# Patient Record
Sex: FEMALE | Race: WHITE | NOT HISPANIC OR LATINO | URBAN - METROPOLITAN AREA
[De-identification: names, ages, dates, MRNs, and addresses within clinical notes are randomized per-mention and may not be internally consistent; named-entity substitution may affect disease eponyms.]

---

## 2017-05-25 ENCOUNTER — OUTPATIENT (OUTPATIENT)
Dept: OUTPATIENT SERVICES | Facility: HOSPITAL | Age: 72
LOS: 1 days | Discharge: HOME | End: 2017-05-25

## 2017-06-28 DIAGNOSIS — Z12.31 ENCOUNTER FOR SCREENING MAMMOGRAM FOR MALIGNANT NEOPLASM OF BREAST: ICD-10-CM

## 2018-05-24 ENCOUNTER — INPATIENT (INPATIENT)
Facility: HOSPITAL | Age: 73
LOS: 3 days | Discharge: HOME | End: 2018-05-28
Attending: INTERNAL MEDICINE | Admitting: INTERNAL MEDICINE

## 2018-05-24 VITALS
DIASTOLIC BLOOD PRESSURE: 109 MMHG | OXYGEN SATURATION: 98 % | RESPIRATION RATE: 20 BRPM | TEMPERATURE: 98 F | SYSTOLIC BLOOD PRESSURE: 146 MMHG | HEART RATE: 64 BPM

## 2018-05-24 LAB
ANION GAP SERPL CALC-SCNC: 13 MMOL/L — SIGNIFICANT CHANGE UP (ref 7–14)
APPEARANCE UR: CLEAR — SIGNIFICANT CHANGE UP
BASOPHILS # BLD AUTO: 0.02 K/UL — SIGNIFICANT CHANGE UP (ref 0–0.2)
BASOPHILS NFR BLD AUTO: 0.4 % — SIGNIFICANT CHANGE UP (ref 0–1)
BILIRUB UR-MCNC: NEGATIVE — SIGNIFICANT CHANGE UP
BUN SERPL-MCNC: 13 MG/DL — SIGNIFICANT CHANGE UP (ref 10–20)
CALCIUM SERPL-MCNC: 9.3 MG/DL — SIGNIFICANT CHANGE UP (ref 8.5–10.1)
CHLORIDE SERPL-SCNC: 100 MMOL/L — SIGNIFICANT CHANGE UP (ref 98–110)
CK MB CFR SERPL CALC: 1.5 NG/ML — SIGNIFICANT CHANGE UP (ref 0.6–6.3)
CK SERPL-CCNC: 115 U/L — SIGNIFICANT CHANGE UP (ref 0–225)
CO2 SERPL-SCNC: 28 MMOL/L — SIGNIFICANT CHANGE UP (ref 17–32)
COLOR SPEC: YELLOW — SIGNIFICANT CHANGE UP
CREAT SERPL-MCNC: 0.7 MG/DL — SIGNIFICANT CHANGE UP (ref 0.7–1.5)
DIFF PNL FLD: NEGATIVE — SIGNIFICANT CHANGE UP
EOSINOPHIL # BLD AUTO: 0.06 K/UL — SIGNIFICANT CHANGE UP (ref 0–0.7)
EOSINOPHIL NFR BLD AUTO: 1.2 % — SIGNIFICANT CHANGE UP (ref 0–8)
GLUCOSE SERPL-MCNC: 95 MG/DL — SIGNIFICANT CHANGE UP (ref 70–99)
GLUCOSE UR QL: NEGATIVE MG/DL — SIGNIFICANT CHANGE UP
HCT VFR BLD CALC: 40.6 % — SIGNIFICANT CHANGE UP (ref 37–47)
HGB BLD-MCNC: 14.1 G/DL — SIGNIFICANT CHANGE UP (ref 12–16)
IMM GRANULOCYTES NFR BLD AUTO: 0.2 % — SIGNIFICANT CHANGE UP (ref 0.1–0.3)
KETONES UR-MCNC: NEGATIVE — SIGNIFICANT CHANGE UP
LEUKOCYTE ESTERASE UR-ACNC: (no result)
LYMPHOCYTES # BLD AUTO: 1.51 K/UL — SIGNIFICANT CHANGE UP (ref 1.2–3.4)
LYMPHOCYTES # BLD AUTO: 30.6 % — SIGNIFICANT CHANGE UP (ref 20.5–51.1)
MCHC RBC-ENTMCNC: 33.1 PG — HIGH (ref 27–31)
MCHC RBC-ENTMCNC: 34.7 G/DL — SIGNIFICANT CHANGE UP (ref 32–37)
MCV RBC AUTO: 95.3 FL — SIGNIFICANT CHANGE UP (ref 81–99)
MONOCYTES # BLD AUTO: 0.37 K/UL — SIGNIFICANT CHANGE UP (ref 0.1–0.6)
MONOCYTES NFR BLD AUTO: 7.5 % — SIGNIFICANT CHANGE UP (ref 1.7–9.3)
NEUTROPHILS # BLD AUTO: 2.96 K/UL — SIGNIFICANT CHANGE UP (ref 1.4–6.5)
NEUTROPHILS NFR BLD AUTO: 60.1 % — SIGNIFICANT CHANGE UP (ref 42.2–75.2)
NITRITE UR-MCNC: NEGATIVE — SIGNIFICANT CHANGE UP
PH UR: 7 — SIGNIFICANT CHANGE UP (ref 5–8)
PLATELET # BLD AUTO: 147 K/UL — SIGNIFICANT CHANGE UP (ref 130–400)
POTASSIUM SERPL-MCNC: 3.9 MMOL/L — SIGNIFICANT CHANGE UP (ref 3.5–5)
POTASSIUM SERPL-SCNC: 3.9 MMOL/L — SIGNIFICANT CHANGE UP (ref 3.5–5)
PROT UR-MCNC: NEGATIVE MG/DL — SIGNIFICANT CHANGE UP
RBC # BLD: 4.26 M/UL — SIGNIFICANT CHANGE UP (ref 4.2–5.4)
RBC # FLD: 13.1 % — SIGNIFICANT CHANGE UP (ref 11.5–14.5)
SODIUM SERPL-SCNC: 141 MMOL/L — SIGNIFICANT CHANGE UP (ref 135–146)
SP GR SPEC: <=1.005 — SIGNIFICANT CHANGE UP (ref 1.01–1.03)
TROPONIN T SERPL-MCNC: <0.01 NG/ML — SIGNIFICANT CHANGE UP
UROBILINOGEN FLD QL: 0.2 MG/DL — SIGNIFICANT CHANGE UP (ref 0.2–0.2)
WBC # BLD: 4.93 K/UL — SIGNIFICANT CHANGE UP (ref 4.8–10.8)
WBC # FLD AUTO: 4.93 K/UL — SIGNIFICANT CHANGE UP (ref 4.8–10.8)

## 2018-05-24 RX ORDER — AMLODIPINE BESYLATE 2.5 MG/1
5 TABLET ORAL ONCE
Qty: 0 | Refills: 0 | Status: COMPLETED | OUTPATIENT
Start: 2018-05-24 | End: 2018-05-24

## 2018-05-24 RX ORDER — HYDROCHLOROTHIAZIDE 25 MG
25 TABLET ORAL ONCE
Qty: 0 | Refills: 0 | Status: COMPLETED | OUTPATIENT
Start: 2018-05-24 | End: 2018-05-24

## 2018-05-24 RX ORDER — LABETALOL HCL 100 MG
10 TABLET ORAL ONCE
Qty: 0 | Refills: 0 | Status: COMPLETED | OUTPATIENT
Start: 2018-05-24 | End: 2018-05-24

## 2018-05-24 RX ADMIN — Medication 25 MILLIGRAM(S): at 18:28

## 2018-05-24 RX ADMIN — AMLODIPINE BESYLATE 5 MILLIGRAM(S): 2.5 TABLET ORAL at 23:48

## 2018-05-24 RX ADMIN — Medication 10 MILLIGRAM(S): at 23:23

## 2018-05-24 RX ADMIN — Medication 5 MILLIGRAM(S): at 23:55

## 2018-05-24 RX ADMIN — AMLODIPINE BESYLATE 5 MILLIGRAM(S): 2.5 TABLET ORAL at 21:43

## 2018-05-24 NOTE — ED PROVIDER NOTE - MEDICAL DECISION MAKING DETAILS
72 F pmh hld, early dementia, seizure disorder, uncontrolled htn on metoprolol and losartan sent to er by her pmd today for sbp 230s in office, asymptomatic.  patient in er with sbp low 200s / dbp 80-90s while she remained asymptomatic.  ct head unchanged. negative cardiac enzymes, unchanged ecg.  patient was given hctz, amlodipine and labetalol iv here in the er with minor improvement in pressure.  discussed with patient's pmd group dr moss who recommended admission for blood pressure control.  patient without signs on htn emergency at this time, will admit to the hospital for trial of bp medications.

## 2018-05-24 NOTE — ED PROVIDER NOTE - PROGRESS NOTE DETAILS
patient seen and evaluated by me.  72 F pmh early dementia, seizure disorder, uncontrolled htn with increase in her medications 2 weeks ago from metoprolol 100mg pd qdaily and losartan increased with an additional dose of metoprolol 50 mg at night presents for htn while in pmd alta payam's office today.  patient occasionally feels "woozy" when standing.  no headache. no dizziness. no chest/pain. no sob.  no n/v. no abd pain.  on exam:  bp 214/100, s1s2 rrr, lungs cta bilaterally. abd soft ntnd. no rebound or guarding.  neuro exam nonfocal.  will check labs, cxr, head ct, give antihypertensive and reassess Attending Note: 71 y/o F PMHx hyperlipidemia, early stage Alzheimer’s, and HTN currently on Losartan and Metoprolol sent in from Dr. Adames’s office for evaluation of /100 and c/o mild lightheadedness. Pt’s  reports x2 weeks ago pt had a change in medication; Dr. Adames added additional Metoprolol 50 mg at night. Pt currently asymptomatic in ED. Denies fever, chills, n/v, cp, sob, pleuritic cp, palpitations, diaphoresis, cough, tinnitus, ear pain, hearing loss, neck pain/stiffness, back pain, photophobia/phonophobia, blurry vision/visual changes, abd pain, diarrhea, constipation, melena/brbpr, urinary symptoms, weakness, numbness/tingling, HA, syncope, sick contacts, recent travel or rash. On exam: wdwn female sitting on stretcher in nad. No rash, no signs of trauma, PERRL, EOM intact, no nystagmus, mmm, neck supple, no spinous ttp to neck or back, FROM, no palpable shelves or step offs, no meningeal signs, regular rate, radial pulses 2/4 b/l, ctabl w/ breath sounds present b/l, no wheezing or crackles, good air exchange, good respiratory effort, no accessory muscle use, no tachypnea, no stridor, bs present throughout all 4 quadrants, abd soft, nd, nt, no rebound tenderness or guarding, no cvat, FROM of upper and lower ext, no calf pain/swelling/erythema, AAOx3. Motor 5/5 and sensation intact throughout upper and lower ext. CN II-XII intact. No facial droop or slurring of speech. (-) Pronator (-) Romberg, no dysmetria w/ ftn or rapid alternating fine movements, heel to shin intact, ambulating with no ataxia or difficulty. NIH O. Plan: Labs, head CT, EKG, and reassess. I discussed patient with Dr Story. recommended inpatient stay for bp management and starting amlodipine. tina - pt signed out to Dr. Blas. F/u [facility], labs. Monitor BP, reassess pt reports no symptoms, tolerated po in ed, repeat exam with no focal deficits, nio o, repeat bp 205/87, Dr. Story recommended pt staying for unctonrolled bp,  prefers this as well, will discuss with medical admitting team, pt with no end organ damage on labs, urine negative, ct with no acute pathology as well, results discussed and understood by pt and family.

## 2018-05-24 NOTE — ED PROVIDER NOTE - NS ED ROS FT
Constitutional: No fever  Eyes:  No visual changes  ENMT:  No neck pain  Cardiac:  No chest pain, palpitations  Respiratory:  No cough, SOB  GI:  No nausea, vomiting, diarrhea, abdominal pain.  :  No dysuria  MS:  No back pain.  Neuro:  No headache or lightheadedness  Skin:  No skin rash  Endocrine: No history of thyroid disease or diabetes.  Except as documented in the HPI,  all other systems are negative.

## 2018-05-24 NOTE — ED PROVIDER NOTE - OBJECTIVE STATEMENT
71yo F with PMHx HTN, HLD, early Alzheimer's sent by Dr. Adames/Dr. Story for eval of BP. Pt currently taking metoprolol (100mg in am, 50 in pm) and losartan. 50mg metoprolol at night added 2 weeks ago without improvement in BP control; BP in office today 230s over 110s with multiple readings at home with systolic over 200. Pt c/o feeling "woozy" when she stands from sitting position, but is otherwise asymptomatic. Denies fever, headache, lightheadedness, CP, SOB, cough, palpitations, nausea, vomiting, diarrhea, abd pain, dysuria, leg swelling.

## 2018-05-24 NOTE — ED PROVIDER NOTE - PHYSICAL EXAMINATION
Vital signs reviewed  GENERAL: Patient well appearing, NAD  HEAD: NCAT  EYES: PERRL, EOMI  ENT: MMM  NECK: Supple, non tender  RESPIRATORY: Normal respiratory effort. CTA B/L. No wheezing, rales, rhonchi  CARDIOVASCULAR: Regular rate and rhythm. Normal S1/S2. No murmurs, rubs or gallops.  ABDOMEN: Soft. Nondistended. Nontender. No guarding or rebound  MUSCULOSKELETAL/EXTREMITIES: Brisk cap refill. 2+ radial pulses. No leg edema.  SKIN:  Warm and dry. No acute rash.  NEURO: AAOx3. Speech clear and coherent. No dysmetria. (-) Romberg. No gross FND. Ambulating without assistance or difficulty  PSYCHIATRIC: Cooperative. Affect appropriate.

## 2018-05-25 ENCOUNTER — TRANSCRIPTION ENCOUNTER (OUTPATIENT)
Age: 73
End: 2018-05-25

## 2018-05-25 DIAGNOSIS — Z98.890 OTHER SPECIFIED POSTPROCEDURAL STATES: Chronic | ICD-10-CM

## 2018-05-25 LAB
ALBUMIN SERPL ELPH-MCNC: 4 G/DL — SIGNIFICANT CHANGE UP (ref 3.5–5.2)
ALP SERPL-CCNC: 66 U/L — SIGNIFICANT CHANGE UP (ref 30–115)
ALT FLD-CCNC: 10 U/L — SIGNIFICANT CHANGE UP (ref 0–41)
ANION GAP SERPL CALC-SCNC: 14 MMOL/L — SIGNIFICANT CHANGE UP (ref 7–14)
ANION GAP SERPL CALC-SCNC: 15 MMOL/L — HIGH (ref 7–14)
AST SERPL-CCNC: 15 U/L — SIGNIFICANT CHANGE UP (ref 0–41)
BILIRUB DIRECT SERPL-MCNC: <0.2 MG/DL — SIGNIFICANT CHANGE UP (ref 0–0.2)
BILIRUB INDIRECT FLD-MCNC: >0.2 MG/DL — SIGNIFICANT CHANGE UP (ref 0.2–1.2)
BILIRUB SERPL-MCNC: 0.4 MG/DL — SIGNIFICANT CHANGE UP (ref 0.2–1.2)
BUN SERPL-MCNC: 10 MG/DL — SIGNIFICANT CHANGE UP (ref 10–20)
BUN SERPL-MCNC: 15 MG/DL — SIGNIFICANT CHANGE UP (ref 10–20)
CALCIUM SERPL-MCNC: 9.6 MG/DL — SIGNIFICANT CHANGE UP (ref 8.5–10.1)
CALCIUM SERPL-MCNC: 9.8 MG/DL — SIGNIFICANT CHANGE UP (ref 8.5–10.1)
CHLORIDE SERPL-SCNC: 98 MMOL/L — SIGNIFICANT CHANGE UP (ref 98–110)
CHLORIDE SERPL-SCNC: 99 MMOL/L — SIGNIFICANT CHANGE UP (ref 98–110)
CK SERPL-CCNC: 145 U/L — SIGNIFICANT CHANGE UP (ref 0–225)
CO2 SERPL-SCNC: 27 MMOL/L — SIGNIFICANT CHANGE UP (ref 17–32)
CO2 SERPL-SCNC: 29 MMOL/L — SIGNIFICANT CHANGE UP (ref 17–32)
CREAT SERPL-MCNC: 0.7 MG/DL — SIGNIFICANT CHANGE UP (ref 0.7–1.5)
CREAT SERPL-MCNC: 0.9 MG/DL — SIGNIFICANT CHANGE UP (ref 0.7–1.5)
GLUCOSE SERPL-MCNC: 109 MG/DL — HIGH (ref 70–99)
GLUCOSE SERPL-MCNC: 97 MG/DL — SIGNIFICANT CHANGE UP (ref 70–99)
MAGNESIUM SERPL-MCNC: 2 MG/DL — SIGNIFICANT CHANGE UP (ref 1.8–2.4)
MAGNESIUM SERPL-MCNC: 2.1 MG/DL — SIGNIFICANT CHANGE UP (ref 1.8–2.4)
PHOSPHATE SERPL-MCNC: 4.4 MG/DL — SIGNIFICANT CHANGE UP (ref 2.1–4.9)
POTASSIUM SERPL-MCNC: 3.2 MMOL/L — LOW (ref 3.5–5)
POTASSIUM SERPL-MCNC: 3.4 MMOL/L — LOW (ref 3.5–5)
POTASSIUM SERPL-SCNC: 3.2 MMOL/L — LOW (ref 3.5–5)
POTASSIUM SERPL-SCNC: 3.4 MMOL/L — LOW (ref 3.5–5)
PROT SERPL-MCNC: 5.9 G/DL — LOW (ref 6–8)
SODIUM SERPL-SCNC: 140 MMOL/L — SIGNIFICANT CHANGE UP (ref 135–146)
SODIUM SERPL-SCNC: 142 MMOL/L — SIGNIFICANT CHANGE UP (ref 135–146)
TROPONIN T SERPL-MCNC: <0.01 NG/ML — SIGNIFICANT CHANGE UP
WBC UR QL: SIGNIFICANT CHANGE UP /HPF

## 2018-05-25 RX ORDER — MEMANTINE HYDROCHLORIDE 10 MG/1
10 TABLET ORAL EVERY 12 HOURS
Qty: 0 | Refills: 0 | Status: DISCONTINUED | OUTPATIENT
Start: 2018-05-25 | End: 2018-05-28

## 2018-05-25 RX ORDER — FOLIC ACID 0.8 MG
1 TABLET ORAL DAILY
Qty: 0 | Refills: 0 | Status: DISCONTINUED | OUTPATIENT
Start: 2018-05-25 | End: 2018-05-28

## 2018-05-25 RX ORDER — METOPROLOL TARTRATE 50 MG
1 TABLET ORAL
Qty: 30 | Refills: 0 | OUTPATIENT
Start: 2018-05-25 | End: 2018-06-23

## 2018-05-25 RX ORDER — AMLODIPINE BESYLATE 2.5 MG/1
10 TABLET ORAL DAILY
Qty: 0 | Refills: 0 | Status: DISCONTINUED | OUTPATIENT
Start: 2018-05-25 | End: 2018-05-25

## 2018-05-25 RX ORDER — ALPRAZOLAM 0.25 MG
0.5 TABLET ORAL ONCE
Qty: 0 | Refills: 0 | Status: DISCONTINUED | OUTPATIENT
Start: 2018-05-25 | End: 2018-05-25

## 2018-05-25 RX ORDER — POTASSIUM CHLORIDE 20 MEQ
1 PACKET (EA) ORAL
Qty: 0 | Refills: 0 | COMMUNITY

## 2018-05-25 RX ORDER — LOSARTAN POTASSIUM 100 MG/1
0 TABLET, FILM COATED ORAL
Qty: 0 | Refills: 0 | COMMUNITY

## 2018-05-25 RX ORDER — POTASSIUM CHLORIDE 20 MEQ
20 PACKET (EA) ORAL
Qty: 0 | Refills: 0 | Status: COMPLETED | OUTPATIENT
Start: 2018-05-25 | End: 2018-05-25

## 2018-05-25 RX ORDER — LOSARTAN POTASSIUM 100 MG/1
100 TABLET, FILM COATED ORAL DAILY
Qty: 0 | Refills: 0 | Status: DISCONTINUED | OUTPATIENT
Start: 2018-05-25 | End: 2018-05-27

## 2018-05-25 RX ORDER — LOSARTAN POTASSIUM 100 MG/1
1 TABLET, FILM COATED ORAL
Qty: 0 | Refills: 0 | COMMUNITY

## 2018-05-25 RX ORDER — AMLODIPINE BESYLATE 2.5 MG/1
1 TABLET ORAL
Qty: 30 | Refills: 0 | OUTPATIENT
Start: 2018-05-25 | End: 2018-06-23

## 2018-05-25 RX ORDER — ESCITALOPRAM OXALATE 10 MG/1
20 TABLET, FILM COATED ORAL DAILY
Qty: 0 | Refills: 0 | Status: DISCONTINUED | OUTPATIENT
Start: 2018-05-25 | End: 2018-05-28

## 2018-05-25 RX ORDER — LOSARTAN POTASSIUM 100 MG/1
1 TABLET, FILM COATED ORAL
Qty: 30 | Refills: 0 | OUTPATIENT
Start: 2018-05-25 | End: 2018-06-23

## 2018-05-25 RX ORDER — METOPROLOL TARTRATE 50 MG
50 TABLET ORAL
Qty: 0 | Refills: 0 | Status: DISCONTINUED | OUTPATIENT
Start: 2018-05-25 | End: 2018-05-27

## 2018-05-25 RX ORDER — THIAMINE MONONITRATE (VIT B1) 100 MG
100 TABLET ORAL DAILY
Qty: 0 | Refills: 0 | Status: DISCONTINUED | OUTPATIENT
Start: 2018-05-25 | End: 2018-05-28

## 2018-05-25 RX ORDER — CARBAMAZEPINE 200 MG
200 TABLET ORAL DAILY
Qty: 0 | Refills: 0 | Status: DISCONTINUED | OUTPATIENT
Start: 2018-05-25 | End: 2018-05-28

## 2018-05-25 RX ORDER — SIMVASTATIN 20 MG/1
20 TABLET, FILM COATED ORAL AT BEDTIME
Qty: 0 | Refills: 0 | Status: DISCONTINUED | OUTPATIENT
Start: 2018-05-25 | End: 2018-05-28

## 2018-05-25 RX ORDER — METOPROLOL TARTRATE 50 MG
1 TABLET ORAL
Qty: 0 | Refills: 0 | COMMUNITY

## 2018-05-25 RX ORDER — DONEPEZIL HYDROCHLORIDE 10 MG/1
10 TABLET, FILM COATED ORAL AT BEDTIME
Qty: 0 | Refills: 0 | Status: DISCONTINUED | OUTPATIENT
Start: 2018-05-25 | End: 2018-05-28

## 2018-05-25 RX ORDER — METOPROLOL TARTRATE 50 MG
100 TABLET ORAL
Qty: 0 | Refills: 0 | Status: DISCONTINUED | OUTPATIENT
Start: 2018-05-25 | End: 2018-05-27

## 2018-05-25 RX ORDER — LOSARTAN POTASSIUM 100 MG/1
1 TABLET, FILM COATED ORAL
Qty: 30 | Refills: 0 | OUTPATIENT
Start: 2018-05-25

## 2018-05-25 RX ORDER — HYDRALAZINE HCL 50 MG
25 TABLET ORAL ONCE
Qty: 0 | Refills: 0 | Status: COMPLETED | OUTPATIENT
Start: 2018-05-25 | End: 2018-05-25

## 2018-05-25 RX ORDER — HYDRALAZINE HCL 50 MG
25 TABLET ORAL EVERY 8 HOURS
Qty: 0 | Refills: 0 | Status: DISCONTINUED | OUTPATIENT
Start: 2018-05-25 | End: 2018-05-27

## 2018-05-25 RX ORDER — METOPROLOL TARTRATE 50 MG
0 TABLET ORAL
Qty: 0 | Refills: 0 | COMMUNITY

## 2018-05-25 RX ORDER — AMLODIPINE BESYLATE 2.5 MG/1
10 TABLET ORAL AT BEDTIME
Qty: 0 | Refills: 0 | Status: DISCONTINUED | OUTPATIENT
Start: 2018-05-25 | End: 2018-05-25

## 2018-05-25 RX ORDER — HYDROCHLOROTHIAZIDE 25 MG
25 TABLET ORAL DAILY
Qty: 0 | Refills: 0 | Status: DISCONTINUED | OUTPATIENT
Start: 2018-05-25 | End: 2018-05-27

## 2018-05-25 RX ADMIN — LOSARTAN POTASSIUM 100 MILLIGRAM(S): 100 TABLET, FILM COATED ORAL at 05:06

## 2018-05-25 RX ADMIN — Medication 25 MILLIGRAM(S): at 21:42

## 2018-05-25 RX ADMIN — Medication 100 MILLIGRAM(S): at 07:43

## 2018-05-25 RX ADMIN — ESCITALOPRAM OXALATE 20 MILLIGRAM(S): 10 TABLET, FILM COATED ORAL at 13:01

## 2018-05-25 RX ADMIN — Medication 20 MILLIEQUIVALENT(S): at 14:19

## 2018-05-25 RX ADMIN — DONEPEZIL HYDROCHLORIDE 10 MILLIGRAM(S): 10 TABLET, FILM COATED ORAL at 21:41

## 2018-05-25 RX ADMIN — Medication 1 MILLIGRAM(S): at 16:57

## 2018-05-25 RX ADMIN — MEMANTINE HYDROCHLORIDE 10 MILLIGRAM(S): 10 TABLET ORAL at 17:00

## 2018-05-25 RX ADMIN — Medication 1 MILLIGRAM(S): at 17:55

## 2018-05-25 RX ADMIN — Medication 25 MILLIGRAM(S): at 05:06

## 2018-05-25 RX ADMIN — SIMVASTATIN 20 MILLIGRAM(S): 20 TABLET, FILM COATED ORAL at 21:42

## 2018-05-25 RX ADMIN — Medication 20 MILLIEQUIVALENT(S): at 16:51

## 2018-05-25 RX ADMIN — Medication 25 MILLIGRAM(S): at 07:43

## 2018-05-25 RX ADMIN — MEMANTINE HYDROCHLORIDE 10 MILLIGRAM(S): 10 TABLET ORAL at 05:06

## 2018-05-25 RX ADMIN — Medication 200 MILLIGRAM(S): at 13:01

## 2018-05-25 RX ADMIN — Medication 50 MILLIGRAM(S): at 20:31

## 2018-05-25 RX ADMIN — Medication 100 MILLIGRAM(S): at 17:55

## 2018-05-25 RX ADMIN — Medication 25 MILLIGRAM(S): at 15:03

## 2018-05-25 RX ADMIN — Medication 0.5 MILLIGRAM(S): at 14:25

## 2018-05-25 NOTE — DISCHARGE NOTE ADULT - CARE PROVIDER_API CALL
Melvin Adames), Internal Medicine  [address] [place] [address]  Phone: [phone]  Fax: [phone] Melvin Adames), Internal Medicine  [address] [place] [address]  Phone: [phone]  Fax: [phone]    Caly Squires), Cardiovascular Disease; Internal Medicine  [address] [place] [address]  Phone: [phone]  Fax: [phone]

## 2018-05-25 NOTE — H&P ADULT - HISTORY OF PRESENT ILLNESS
71 y/o F with PMH of hypertension, alzheimer's dementia, and seizure disorder was sent in by her primary care physician for uncontrolled hypertension.  Both the patient and her  were poor historians, but stated for the past month or so, her blood pressure has been higher than usual. Her metoprolol was recently increased 2 weeks ago without improvement. In the office today her blood pressure was 230s / 110s, so her doctor sent her to the ED for blood pressure control. She denies any symptoms including fever, headache, lightheadedness, chest pain, shortness of breath, palpitations, nausea, vomiting, abdominal pain, and leg swelling.  Patient admits to forgetting to take her blood pressure medications on 5/23/18.    In the ED, BP was measured to be 214 / 100. After 10mg of amlodipine it was 205 / 87.  She was then given 25mg HCTZ, 5mg IV vasotec, and 10mg IV labetalol. Repeat BP after these medications was not documented.

## 2018-05-25 NOTE — CHART NOTE - NSCHARTNOTEFT_GEN_A_CORE
Patient was hypertensive to 190/90 this afternoon. She also started having features of alcohol withdrawl ( of note patient drinks 2-3 shots of vodka every day, Last drink being two days back), was tremoulous, had tactile hallucinations, anxious -- CIWA score of 9-10.     Started on CIWA protocol.  Will monitor for other withdrawl symptoms    Discussed with attending Dr. Duval.        PGY1 resident   Anna Baker

## 2018-05-25 NOTE — H&P ADULT - NSHPSOCIALHISTORY_GEN_ALL_CORE
Former smoker. Approximately 10 pack year history. Quit 20 years ago. No alcohol or illicit drug use.

## 2018-05-25 NOTE — H&P ADULT - PMH
Alzheimer's dementia without behavioral disturbance, unspecified timing of dementia onset    HTN (hypertension)    Hypercholesteremia    Seizure disorder

## 2018-05-25 NOTE — H&P ADULT - NSHPPHYSICALEXAM_GEN_ALL_CORE
T(F): 96.1 (05-24-18 @ 23:32), Max: 98.7 (05-24-18 @ 23:24)  HR: 66 (05-25-18 @ 00:59) (60 - 76)  BP: 134/74 (05-25-18 @ 00:59) (134/74 - 241/103)  RR: 18 (05-24-18 @ 23:32) (18 - 20)  SpO2: 100% (05-24-18 @ 23:32) (97% - 100%)    Physical Exam:  General: Not in distress.   HEENT: Moist mucus membranes. PERRLA.  Cardio: Regular rate and rhythm, S1, S2, no murmur, rub, or gallop.  Pulm: Clear to auscultation bilaterally. No wheezing, rales, or rhonchi.  Abdomen: Soft, non-tender, non-distended. Normoactive bowel sounds.  Extremities: No cyanosis or edema bilaterally. No calf tenderness to palpation.  Neuro: A&O x3. No focal deficits. CN II - XII grossly intact.

## 2018-05-25 NOTE — H&P ADULT - NSHPLABSRESULTS_GEN_ALL_CORE
CBC Full  -  ( 24 May 2018 17:11 )  WBC Count : 4.93 K/uL  Hemoglobin : 14.1 g/dL  Hematocrit : 40.6 %  Platelet Count - Automated : 147 K/uL  Mean Cell Volume : 95.3 fL  Mean Cell Hemoglobin : 33.1 pg  Mean Cell Hemoglobin Concentration : 34.7 g/dL  Auto Neutrophil % : 60.1 %  Auto Lymphocyte % : 30.6 %  Auto Monocyte % : 7.5 %  Auto Eosinophil % : 1.2 %  Auto Basophil % : 0.4 %    BMP: 05-24-18 @ 17:11  141 | 100 | 13   -----------------< 95  3.9  | 28 | 0.7  eGFR(AA): 100, eGFR (non-AA): 86  Ca 9.3, Mg --, P -- CBC Full  -  ( 24 May 2018 17:11 )  WBC Count : 4.93 K/uL  Hemoglobin : 14.1 g/dL  Hematocrit : 40.6 %  Platelet Count - Automated : 147 K/uL  Mean Cell Volume : 95.3 fL  Mean Cell Hemoglobin : 33.1 pg  Mean Cell Hemoglobin Concentration : 34.7 g/dL  Auto Neutrophil % : 60.1 %  Auto Lymphocyte % : 30.6 %  Auto Monocyte % : 7.5 %  Auto Eosinophil % : 1.2 %  Auto Basophil % : 0.4 %    BMP: 05-24-18 @ 17:11  141 | 100 | 13   -----------------< 95  3.9  | 28 | 0.7  eGFR(AA): 100, eGFR (non-AA): 86  Ca 9.3, Mg --, P --    Cardiac Enzymes: 05-24-18 @ 17:11  Trop T: <0.01  Trop I:   CKMB: 1.5   CK: 115    CT Head No Cont (05.24.18 @ 20:54):    1.  No acute intra-cranial pathology.  2.  Post left frontal craniotomy with stable region of encephalomalacia within the left frontal lobe.  3.  Chronic microvascular ischemic changes.     ECG: sinus rhythm.

## 2018-05-25 NOTE — DISCHARGE NOTE ADULT - PLAN OF CARE
Control blood pressure Take medication regularly, follow up with your PMD Prevent recurrence Alcohol abstinence

## 2018-05-25 NOTE — DISCHARGE NOTE ADULT - CARE PLAN
Principal Discharge DX:	Uncontrolled hypertension  Goal:	Control blood pressure  Assessment and plan of treatment:	Take medication regularly, follow up with your PMD Principal Discharge DX:	Uncontrolled hypertension  Goal:	Control blood pressure  Assessment and plan of treatment:	Take medication regularly, follow up with your PMD  Secondary Diagnosis:	Alcohol withdrawal syndrome, with delirium  Goal:	Prevent recurrence  Assessment and plan of treatment:	Alcohol abstinence

## 2018-05-25 NOTE — H&P ADULT - ATTENDING COMMENTS
pt seen and examined independently, I have read and agree with above exam and poa, h/o alzheimers1 anxious, confused, diaphoretic, bp elevated  discusssed with  positiv eh/o etoh daiky, often more than just one drink  thiamine/ folate/ xanax given/ ativan prn  hyddralazine/ clondine  renal eval dr grimm  bp fu

## 2018-05-25 NOTE — DISCHARGE NOTE ADULT - HOSPITAL COURSE
72 f sent in by PMD for uncontrolled BP of 200/100, treated with iv push of labetalol initially, and home medication. Norvasc added to home medication. BP controlled to around 160/70 now. Will d/c home today 72 f sent in by PMD for uncontrolled BP of 200/100, treated with iv push of labetalol initially, and home medication, hydralazine added. Also had mild alcohol withdrawl features, controlled with PRN ativan. WIll d/c home tomorrow if stable 72 f sent in by PMD for uncontrolled BP of 200/100, treated with iv push of labetalol initially, and then changed to home medication. Cardiology consulted for BP , Dr Young recommended, Nifedipine, valsartan, HCTZ and labetalol. Also had mild alcohol withdrawl features, improved with PRN ativan, requiring no ativan in last 48 hours. Will d/c home today 72 f sent in by PMD for uncontrolled BP of 200/100, treated with iv push of labetalol initially, and then changed to home medication. Cardiology consulted for BP , Dr Young recommended, Nifedipine, valsartan, HCTZ and labetalol. Also had mild alcohol withdrawl features, improved with PRN ativan, last dose 1 mg at PM yesterday.    Patient this AM 5/29 is AOx 2, doesnt remeber how she end up in the hospital. No features of alchol withdrawl.  tells that this confusion and memory loss is baseline for her. her blood pressure is well controlled over last 24 hours.  wants to take her home today. Will d/c

## 2018-05-25 NOTE — DISCHARGE NOTE ADULT - MEDICATION SUMMARY - MEDICATIONS TO TAKE
I will START or STAY ON the medications listed below when I get home from the hospital:    losartan 100 mg oral tablet  -- 1 tab(s) by mouth once a day in morning  -- Indication: For HTN (hypertension)    carBAMazepine 200 mg oral tablet, extended release  -- 1 tab(s) by mouth once a day  -- Indication: For Seizure disorder    Lexapro 20 mg oral tablet  -- 1 tab(s) by mouth once a day  -- Indication: For Depression    simvastatin 20 mg oral tablet  -- 1 tab(s) by mouth once a day (at bedtime)  -- Indication: For dylipidemia    Lopressor 50 mg oral tablet  -- 1 tab(s) by mouth once a day  PM   -- Indication: For HTN (hypertension)    metoprolol tartrate 100 mg oral tablet  -- 1 tab(s) by mouth once a day  AM   -- Indication: For HTN (hypertension)    amLODIPine 10 mg oral tablet  -- 1 tab(s) by mouth once a day (at bedtime)  -- Indication: For HTN (hypertension)    donepezil 10 mg oral tablet  -- 1 tab(s) by mouth every 12 hours  -- Indication: For dementia    hydroCHLOROthiazide 25 mg oral tablet  -- 1 tab(s) by mouth once a day in morning  -- Indication: For HTN (hypertension)    Namenda XR 28 mg oral capsule, extended release  -- 1 cap(s) by mouth once a day  -- Indication: For dementia I will START or STAY ON the medications listed below when I get home from the hospital:    losartan 100 mg oral tablet  -- 1 tab(s) by mouth once a day in morning  -- Indication: For HTN (hypertension)    carBAMazepine 200 mg oral tablet, extended release  -- 1 tab(s) by mouth once a day  -- Indication: For Seizure disorder    Lexapro 20 mg oral tablet  -- 1 tab(s) by mouth once a day  -- Indication: For Depression    simvastatin 20 mg oral tablet  -- 1 tab(s) by mouth once a day (at bedtime)  -- Indication: For dylipidemia    Lopressor 50 mg oral tablet  -- 1 tab(s) by mouth once a day  PM   -- Indication: For HTN (hypertension)    metoprolol tartrate 100 mg oral tablet  -- 1 tab(s) by mouth once a day  AM   -- Indication: For HTN (hypertension)    donepezil 10 mg oral tablet  -- 1 tab(s) by mouth every 12 hours  -- Indication: For dementia    hydroCHLOROthiazide 25 mg oral tablet  -- 1 tab(s) by mouth once a day in morning  -- Indication: For HTN (hypertension)    Namenda XR 28 mg oral capsule, extended release  -- 1 cap(s) by mouth once a day  -- Indication: For dementia    hydrALAZINE 25 mg oral tablet  -- 1 tab(s) by mouth every 8 hours   -- Indication: For Uncontrolled hypertension I will START or STAY ON the medications listed below when I get home from the hospital:    carBAMazepine 200 mg oral tablet, extended release  -- 1 tab(s) by mouth once a day  -- Indication: For Seizure disorder    Lexapro 20 mg oral tablet  -- 1 tab(s) by mouth once a day  -- Indication: For Depression    simvastatin 20 mg oral tablet  -- 1 tab(s) by mouth once a day (at bedtime)  -- Indication: For dylipidemia    valsartan-hydrochlorothiazide 320mg-12.5mg oral tablet  -- 1 tab(s) by mouth once a day   -- Avoid prolonged or excessive exposure to direct and/or artificial sunlight while taking this medication.  Do not take this drug if you are pregnant.  It is very important that you take or use this exactly as directed.  Do not skip doses or discontinue unless directed by your doctor.  Some non-prescription drugs may aggravate your condition.  Read all labels carefully.  If a warning appears, check with your doctor before taking.    -- Indication: For HTN (hypertension)    labetalol 100 mg oral tablet  -- 1 tab(s) by mouth 2 times a day  -- Indication: For HTN(HYPERTENSION)    NIFEdipine 60 mg oral tablet, extended release  -- 1 tab(s) by mouth once a day (at bedtime)  -- Indication: For HTN(HYPERTENSION)    donepezil 10 mg oral tablet  -- 1 tab(s) by mouth every 12 hours  -- Indication: For dementia    Namenda XR 28 mg oral capsule, extended release  -- 1 cap(s) by mouth once a day  -- Indication: For dementia

## 2018-05-25 NOTE — H&P ADULT - ASSESSMENT
73 y/o F with PMH of hypertension, alzheimer's dementia, and seizure disorder was sent in by her primary care physician for uncontrolled hypertension.    1.) Hypertensive Urgency:    - s/p 10mg amlodipine, 5mg IV vasotec, and 10mg IV labetalol in the ED. Repeat BP was not documented by the ED staff.    - Continue metoprolol, losartan, and HCTZ.    - Monitor blood pressure.    - Consider adding calcium channel blocker if patient remains hypertensive.    2.) Alzheimer's dementia:    - Continue donepezil and namenda.    3.) Seizure disorder:    - Continue carbamazepine.    4.) Hyperlipidemia:     - Continue simvastatin.    5.) GI / DVT PPx: not indicated / lovenox.    6.) Disposition:    - Full Code.

## 2018-05-25 NOTE — DISCHARGE NOTE ADULT - MEDICATION SUMMARY - MEDICATIONS TO STOP TAKING
I will STOP taking the medications listed below when I get home from the hospital:  None I will STOP taking the medications listed below when I get home from the hospital:    potassium chloride 20 mEq oral powder for reconstitution  -- 1 dose(s) by mouth once a day I will STOP taking the medications listed below when I get home from the hospital:    hydroCHLOROthiazide 25 mg oral tablet  -- 1 tab(s) by mouth once a day    losartan 100 mg oral tablet  -- 1 tab(s) by mouth once a day    potassium chloride 20 mEq oral powder for reconstitution  -- 1 dose(s) by mouth once a day    metoprolol tartrate 100 mg oral tablet  -- 1 tab(s) by mouth once a day (in the morning)    metoprolol tartrate 50 mg oral tablet  -- 1 tab(s) by mouth once a day (in the evening)

## 2018-05-25 NOTE — DISCHARGE NOTE ADULT - PATIENT PORTAL LINK FT
You can access the [facility] Patient Portal, offered by [facility], by registering with the following website: [email or web address]

## 2018-05-26 RX ORDER — POTASSIUM CHLORIDE 20 MEQ
20 PACKET (EA) ORAL
Qty: 0 | Refills: 0 | Status: COMPLETED | OUTPATIENT
Start: 2018-05-26 | End: 2018-05-26

## 2018-05-26 RX ORDER — HYDRALAZINE HCL 50 MG
1 TABLET ORAL
Qty: 90 | Refills: 0 | OUTPATIENT
Start: 2018-05-26 | End: 2018-06-24

## 2018-05-26 RX ADMIN — DONEPEZIL HYDROCHLORIDE 10 MILLIGRAM(S): 10 TABLET, FILM COATED ORAL at 21:43

## 2018-05-26 RX ADMIN — Medication 25 MILLIGRAM(S): at 21:43

## 2018-05-26 RX ADMIN — SIMVASTATIN 20 MILLIGRAM(S): 20 TABLET, FILM COATED ORAL at 21:43

## 2018-05-26 RX ADMIN — Medication 25 MILLIGRAM(S): at 05:50

## 2018-05-26 RX ADMIN — MEMANTINE HYDROCHLORIDE 10 MILLIGRAM(S): 10 TABLET ORAL at 05:51

## 2018-05-26 RX ADMIN — Medication 1 MILLIGRAM(S): at 11:53

## 2018-05-26 RX ADMIN — Medication 25 MILLIGRAM(S): at 13:49

## 2018-05-26 RX ADMIN — LOSARTAN POTASSIUM 100 MILLIGRAM(S): 100 TABLET, FILM COATED ORAL at 05:51

## 2018-05-26 RX ADMIN — Medication 100 MILLIGRAM(S): at 11:48

## 2018-05-26 RX ADMIN — MEMANTINE HYDROCHLORIDE 10 MILLIGRAM(S): 10 TABLET ORAL at 17:50

## 2018-05-26 RX ADMIN — Medication 100 MILLIGRAM(S): at 11:53

## 2018-05-26 RX ADMIN — Medication 20 MILLIEQUIVALENT(S): at 17:48

## 2018-05-26 RX ADMIN — Medication 1 MILLIGRAM(S): at 22:03

## 2018-05-26 RX ADMIN — Medication 20 MILLIEQUIVALENT(S): at 17:51

## 2018-05-26 RX ADMIN — ESCITALOPRAM OXALATE 20 MILLIGRAM(S): 10 TABLET, FILM COATED ORAL at 11:54

## 2018-05-26 RX ADMIN — Medication 50 MILLIGRAM(S): at 18:39

## 2018-05-26 NOTE — PROGRESS NOTE ADULT - SUBJECTIVE AND OBJECTIVE BOX
Patient was seen and examined. Spoke with RN. Chart reviewed.    No events overnight.  Vital Signs Last 24 Hrs  T(F): 97 (26 May 2018 13:37), Max: 97.4 (25 May 2018 20:00)  HR: 66 (26 May 2018 13:37) (63 - 74)  BP: 170/73 (26 May 2018 13:37) (124/78 - 198/92)  SpO2: --  MEDICATIONS  (STANDING):  carBAMazepine XR Tablet 200 milliGRAM(s) Oral daily  donepezil 10 milliGRAM(s) Oral at bedtime  escitalopram 20 milliGRAM(s) Oral daily  folic acid 1 milliGRAM(s) Oral daily  hydrALAZINE 25 milliGRAM(s) Oral every 8 hours  hydrochlorothiazide 25 milliGRAM(s) Oral daily  losartan 100 milliGRAM(s) Oral daily  memantine 10 milliGRAM(s) Oral every 12 hours  metoprolol tartrate 100 milliGRAM(s) Oral <User Schedule>  metoprolol tartrate 50 milliGRAM(s) Oral <User Schedule>  potassium chloride    Tablet ER 20 milliEquivalent(s) Oral every 2 hours  simvastatin 20 milliGRAM(s) Oral at bedtime  thiamine 100 milliGRAM(s) Oral daily    MEDICATIONS  (PRN):  LORazepam     Tablet 1 milliGRAM(s) Oral every 2 hours PRN CIWA-Ar score 8 or greater    Labs:                        14.1   4.93  )-----------( 147      ( 24 May 2018 17:11 )             40.6     25 May 2018 22:12    140    |  98     |  15     ----------------------------<  97     3.4     |  27     |  0.9    25 May 2018 07:42    142    |  99     |  10     ----------------------------<  109    3.2     |  29     |  0.7      Ca    9.6        25 May 2018 22:12  Ca    9.8        25 May 2018 07:42  Phos  4.4       25 May 2018 22:12  Mg     2.1       25 May 2018 22:12  Mg     2.0       25 May 2018 07:42    TPro  5.9    /  Alb  4.0    /  TBili  0.4    /  DBili  <0.2   /  AST  15     /  ALT  10     /  AlkPhos  66     25 May 2018 22:12      Urinalysis Basic - ( 24 May 2018 22:38 )    Color: Yellow / Appearance: Clear / SG: <=1.005 / pH: x  Gluc: x / Ketone: Negative  / Bili: Negative / Urobili: 0.2 mg/dL   Blood: x / Protein: Negative mg/dL / Nitrite: Negative   Leuk Esterase: Trace / RBC: x / WBC 1-2 /HPF   Sq Epi: x / Non Sq Epi: x / Bacteria: x          Radiology:    General: comfortable, NAD  Neurology: A&Ox3, nonfocal  Head:  Normocephalic, atraumatic  ENT:  Mucosa moist, no ulcerations  Neck:  Supple, no JVD,   Skin: no breakdowns (as per RN)  Resp: CTA B/L  CV: RRR, S1S2,   GI: Soft, NT, bowel sounds  MS: No edema, + peripheral pulses, FROM all 4 extremity

## 2018-05-26 NOTE — PROGRESS NOTE ADULT - ASSESSMENT
· Assessment		  73 y/o F with PMH of hypertension, alzheimer's dementia, and seizure disorder was sent in by her primary care physician for uncontrolled hypertension.    # Uncontrolled BP complicated by alcohol withdrawl:  - BP better controlled now  - On home dose of Metoprolol, losartan and HCTZ. Hydralazine added  - No signs of end organ damage    # Alcohol withdrawl:  Drinks 2-3 shots of vodka every day as per   Last drink 1 day PTP  Today symptoms improved, still a little confused   c/w thiamine and folic acid    # Alzheimer's dementia:    - Continue donepezil and namenda.    # Seizure disorder:    - Continue carbamazepine.    #Hyperlipidemia:     - Continue simvastatin.    # GI / DVT PPx: not indicated / lovenox.  #  Disposition: will go home tomorrow likely    -

## 2018-05-26 NOTE — PROGRESS NOTE ADULT - SUBJECTIVE AND OBJECTIVE BOX
Patient is a 72y old  Female who presents with a chief complaint of Uncontrolled blood pressure. (25 May 2018 11:48)    Interval events:  Patient had mild symptoms of alcohol withdrawl yesterday.  This AM patient feels much better, still a little confused, doesnt remember what happened yesterday but no other symptoms of withdrawl      PAST MEDICAL & SURGICAL HISTORY:  Seizure disorder  Alzheimer's dementia without behavioral disturbance, unspecified timing of dementia onset  Hypercholesteremia  HTN (hypertension)  History of craniotomy      MEDICATIONS  (STANDING):  carBAMazepine XR Tablet 200 milliGRAM(s) Oral daily  donepezil 10 milliGRAM(s) Oral at bedtime  escitalopram 20 milliGRAM(s) Oral daily  folic acid 1 milliGRAM(s) Oral daily  hydrALAZINE 25 milliGRAM(s) Oral every 8 hours  hydrochlorothiazide 25 milliGRAM(s) Oral daily  losartan 100 milliGRAM(s) Oral daily  memantine 10 milliGRAM(s) Oral every 12 hours  metoprolol tartrate 100 milliGRAM(s) Oral <User Schedule>  metoprolol tartrate 50 milliGRAM(s) Oral <User Schedule>  potassium chloride    Tablet ER 20 milliEquivalent(s) Oral every 2 hours  simvastatin 20 milliGRAM(s) Oral at bedtime  thiamine 100 milliGRAM(s) Oral daily    MEDICATIONS  (PRN):  LORazepam     Tablet 1 milliGRAM(s) Oral every 2 hours PRN CIWA-Ar score 8 or greater      Overnight events:    Vital Signs Last 24 Hrs  T(C): 35.2 (26 May 2018 05:16), Max: 36.3 (25 May 2018 14:38)  T(F): 95.4 (26 May 2018 05:16), Max: 97.4 (25 May 2018 20:00)  HR: 63 (26 May 2018 05:16) (63 - 74)  BP: 151/80 (26 May 2018 05:16) (124/78 - 198/92)  BP(mean): --  RR: 18 (26 May 2018 05:16) (18 - 18)  SpO2: --  CAPILLARY BLOOD GLUCOSE        I&O's Summary      Physical Exam:    -     General : lying in bed NAD    -      Cardiac: Regular rate    -      Pulm: b/l clear    -      GI: soft non tender    -      Musculoskeletal: no edema, no tremors    -      Neuro: AO x 2, non focal        Labs:                        14.1   4.93  )-----------( 147      ( 24 May 2018 17:11 )             40.6             05-25    140  |  98  |  15  ----------------------------<  97  3.4<L>   |  27  |  0.9    Ca    9.6      25 May 2018 22:12  Phos  4.4     05-25  Mg     2.1     05-25    TPro  5.9<L>  /  Alb  4.0  /  TBili  0.4  /  DBili  <0.2  /  AST  15  /  ALT  10  /  AlkPhos  66  05-25    LIVER FUNCTIONS - ( 25 May 2018 22:12 )  Alb: 4.0 g/dL / Pro: 5.9 g/dL / ALK PHOS: 66 U/L / ALT: 10 U/L / AST: 15 U/L / GGT: x                   CARDIAC MARKERS ( 25 May 2018 11:14 )  x     / <0.01 ng/mL / 145 U/L / x     / x      CARDIAC MARKERS ( 24 May 2018 17:11 )  x     / <0.01 ng/mL / 115 U/L / x     / 1.5 ng/mL        Urinalysis Basic - ( 24 May 2018 22:38 )    Color: Yellow / Appearance: Clear / SG: <=1.005 / pH: x  Gluc: x / Ketone: Negative  / Bili: Negative / Urobili: 0.2 mg/dL   Blood: x / Protein: Negative mg/dL / Nitrite: Negative   Leuk Esterase: Trace / RBC: x / WBC 1-2 /HPF   Sq Epi: x / Non Sq Epi: x / Bacteria: x          Imaging:    ECG:

## 2018-05-26 NOTE — PROGRESS NOTE ADULT - ASSESSMENT
71 y/o F with PMH of hypertension, alzheimer's dementia, and seizure disorder was sent in by her primary care physician for uncontrolled hypertension.    # Uncontrolled BP complicated by alcohol withdrawl:  - BP better controlled now  - On home dose of Metoprolol, losartan and HCTZ. Hydralazine added  - No signs of end organ damage    # Alcohol withdrawl:  Drinks 2-3 shots of vodka every day as per   Last drink 1 day PTP  Today symptoms improved, still a little confused   c/w thiamine and folic acid    # Alzheimer's dementia:    - Continue donepezil and namenda.    # Seizure disorder:    - Continue carbamazepine.    #Hyperlipidemia:     - Continue simvastatin.    # GI / DVT PPx: not indicated / lovenox.    #  Disposition: will go home tomorrow likely    -

## 2018-05-27 LAB
ANION GAP SERPL CALC-SCNC: 17 MMOL/L — HIGH (ref 7–14)
BUN SERPL-MCNC: 15 MG/DL — SIGNIFICANT CHANGE UP (ref 10–20)
CALCIUM SERPL-MCNC: 9.7 MG/DL — SIGNIFICANT CHANGE UP (ref 8.5–10.1)
CHLORIDE SERPL-SCNC: 99 MMOL/L — SIGNIFICANT CHANGE UP (ref 98–110)
CO2 SERPL-SCNC: 26 MMOL/L — SIGNIFICANT CHANGE UP (ref 17–32)
CREAT SERPL-MCNC: 0.7 MG/DL — SIGNIFICANT CHANGE UP (ref 0.7–1.5)
GLUCOSE SERPL-MCNC: 104 MG/DL — HIGH (ref 70–99)
POTASSIUM SERPL-MCNC: 3.2 MMOL/L — LOW (ref 3.5–5)
POTASSIUM SERPL-SCNC: 3.2 MMOL/L — LOW (ref 3.5–5)
SODIUM SERPL-SCNC: 142 MMOL/L — SIGNIFICANT CHANGE UP (ref 135–146)

## 2018-05-27 RX ORDER — NIFEDIPINE 30 MG
60 TABLET, EXTENDED RELEASE 24 HR ORAL AT BEDTIME
Qty: 0 | Refills: 0 | Status: DISCONTINUED | OUTPATIENT
Start: 2018-05-27 | End: 2018-05-28

## 2018-05-27 RX ORDER — VALSARTAN 80 MG/1
320 TABLET ORAL DAILY
Qty: 0 | Refills: 0 | Status: DISCONTINUED | OUTPATIENT
Start: 2018-05-27 | End: 2018-05-28

## 2018-05-27 RX ORDER — HYDROCHLOROTHIAZIDE 25 MG
12.5 TABLET ORAL DAILY
Qty: 0 | Refills: 0 | Status: DISCONTINUED | OUTPATIENT
Start: 2018-05-27 | End: 2018-05-28

## 2018-05-27 RX ORDER — LABETALOL HCL 100 MG
100 TABLET ORAL
Qty: 0 | Refills: 0 | Status: DISCONTINUED | OUTPATIENT
Start: 2018-05-27 | End: 2018-05-28

## 2018-05-27 RX ADMIN — ESCITALOPRAM OXALATE 20 MILLIGRAM(S): 10 TABLET, FILM COATED ORAL at 12:57

## 2018-05-27 RX ADMIN — Medication 25 MILLIGRAM(S): at 05:57

## 2018-05-27 RX ADMIN — LOSARTAN POTASSIUM 100 MILLIGRAM(S): 100 TABLET, FILM COATED ORAL at 05:57

## 2018-05-27 RX ADMIN — DONEPEZIL HYDROCHLORIDE 10 MILLIGRAM(S): 10 TABLET, FILM COATED ORAL at 23:00

## 2018-05-27 RX ADMIN — Medication 200 MILLIGRAM(S): at 12:55

## 2018-05-27 RX ADMIN — Medication 25 MILLIGRAM(S): at 05:58

## 2018-05-27 RX ADMIN — Medication 100 MILLIGRAM(S): at 17:50

## 2018-05-27 RX ADMIN — VALSARTAN 320 MILLIGRAM(S): 80 TABLET ORAL at 17:48

## 2018-05-27 RX ADMIN — Medication 60 MILLIGRAM(S): at 22:59

## 2018-05-27 RX ADMIN — Medication 1 MILLIGRAM(S): at 17:48

## 2018-05-27 RX ADMIN — Medication 100 MILLIGRAM(S): at 12:56

## 2018-05-27 RX ADMIN — SIMVASTATIN 20 MILLIGRAM(S): 20 TABLET, FILM COATED ORAL at 22:59

## 2018-05-27 RX ADMIN — MEMANTINE HYDROCHLORIDE 10 MILLIGRAM(S): 10 TABLET ORAL at 05:57

## 2018-05-27 RX ADMIN — MEMANTINE HYDROCHLORIDE 10 MILLIGRAM(S): 10 TABLET ORAL at 17:50

## 2018-05-27 RX ADMIN — Medication 12.5 MILLIGRAM(S): at 12:53

## 2018-05-27 RX ADMIN — Medication 1 MILLIGRAM(S): at 12:57

## 2018-05-27 RX ADMIN — Medication 100 MILLIGRAM(S): at 10:04

## 2018-05-27 NOTE — CONSULT NOTE ADULT - ASSESSMENT
uncontrolled HTN on multiple medication  bradycardia: metoprolol and cranyotomy uncontrolled HTN on multiple medications several times a day  bradycardia: metoprolol and cranyotomy are the culprit    I advise a complete change in the medical therapy: Valsartan 320 mg, HCTZ 12.5 mg (then as out patient will be on Valsartan-HCTZ as one pill), Labetalol 100 mg bid and Nifedipine xl 60 mg daily, stop metoprolol, hydralazine, losartan and HCTZ 25 mg  by this way will take 3 different pills and in total of 4 times, while right now is taking much morekeep in the hospital for 24 hours, if stable anticipate d/c tomorrow, i spoke to her and on the phone to her  uncontrolled HTN on multiple medications several times a day  bradycardia: metoprolol and cranyotomy are the culprit    I advise a complete change in the medical therapy: Valsartan 320 mg, HCTZ 12.5 mg (then as out patient will be on Valsartan-HCTZ as one pill), Labetalol 100 mg bid and Nifedipine xl 60 mg daily, stop metoprolol, hydralazine, losartan and HCTZ 25 mg  by this way will take 3 different pills and in total of 4 times, while right now is taking much morekeep in the hospital for 24 hours, if stable anticipate d/c tomorrow, i spoke to her and on the phone to her    IF STILL HYPERTENSIVE (GOAL 140/80 OR BELOW) THEN WILL INCREASE LABETALOL  mg BID)  OUT PATIENT MED: VALSARTAN-HCTZ 320-12.5 AND LABETALOL 100 ( mg) BID, NIFEDIPINE XL 60 mg AT NIGHT   follow up with dr Lara in 2-3 weeks

## 2018-05-27 NOTE — PROGRESS NOTE ADULT - SUBJECTIVE AND OBJECTIVE BOX
Patient was seen and examined. Spoke with RN. Chart reviewed.  No events overnight.  Vital Signs Last 24 Hrs  T(F): 96.5 (27 May 2018 00:33), Max: 97.9 (26 May 2018 21:19)  HR: 62 (27 May 2018 00:33) (61 - 66)  BP: 185/85 (27 May 2018 00:33) (170/73 - 185/85)  SpO2: --  MEDICATIONS  (STANDING):  carBAMazepine XR Tablet 200 milliGRAM(s) Oral daily  donepezil 10 milliGRAM(s) Oral at bedtime  escitalopram 20 milliGRAM(s) Oral daily  folic acid 1 milliGRAM(s) Oral daily  hydrALAZINE 25 milliGRAM(s) Oral every 8 hours  hydrochlorothiazide 25 milliGRAM(s) Oral daily  losartan 100 milliGRAM(s) Oral daily  memantine 10 milliGRAM(s) Oral every 12 hours  metoprolol tartrate 100 milliGRAM(s) Oral <User Schedule>  metoprolol tartrate 50 milliGRAM(s) Oral <User Schedule>  simvastatin 20 milliGRAM(s) Oral at bedtime  thiamine 100 milliGRAM(s) Oral daily    MEDICATIONS  (PRN):  LORazepam     Tablet 1 milliGRAM(s) Oral every 2 hours PRN CIWA-Ar score 8 or greater    Labs:    25 May 2018 22:12    140    |  98     |  15     ----------------------------<  97     3.4     |  27     |  0.9      Ca    9.6        25 May 2018 22:12  Phos  4.4       25 May 2018 22:12  Mg     2.1       25 May 2018 22:12    TPro  5.9    /  Alb  4.0    /  TBili  0.4    /  DBili  <0.2   /  AST  15     /  ALT  10     /  AlkPhos  66     25 May 2018 22:12        Pt sitting in chair in hallway- comfortable  General: comfortable, NAD  Neurology: dementia;  nonfocal  Head:  Normocephalic, atraumatic  ENT:  Mucosa moist, no ulcerations  Neck:  Supple, no JVD,   Skin: no breakdowns (as per RN)  Resp: CTA B/L  CV: RRR, S1S2,   GI: Soft, NT, bowel sounds  MS: No edema, + peripheral pulses,       A/P:  73 y/o F with PMH of hypertension, alzheimer's dementia, and seizure disorder was sent in by her primary care physician for uncontrolled hypertension.    # Uncontrolled BP complicated by alcohol withdrawl:  - BP better controlled now but still elevated   - On home dose of Metoprolol, losartan and HCTZ. Hydralazine added  - No signs of end organ damage    # Alcohol withdrawl:  Drinks 2-3 shots of vodka every day as per   Last drink 1 day PTP  Today symptoms improved, still a little confused   c/w thiamine and folic acid    # Alzheimer's dementia:    - Continue donepezil and namenda.    # Seizure disorder:    - Continue carbamazepine.    #Hyperlipidemia:     - Continue simvastatin.    DC planning for outpt BP monitoring and med adjustment    DVT prophylaxis  Decubitus prevention- all measures as per RN protocol  Please call or text me with any questions or updates

## 2018-05-27 NOTE — CONSULT NOTE ADULT - SUBJECTIVE AND OBJECTIVE BOX
Patient is a 72y old  Female who presents with a chief complaint of Uncontrolled blood pressure. (25 May 2018 11:48)      HPI: Patient son called me and asked to see his mother. She has been a patient of our office (Dr. Lara)    71 y/o F with PMH of hypertension, alzheimer's dementia, and seizure disorder was sent in by her primary care physician for uncontrolled hypertension.  Both the patient and her  were poor historians, but stated for the past month or so, her blood pressure has been higher than usual. Her metoprolol was recently increased 2 weeks ago without improvement. In the office today her blood pressure was 230s / 110s, so her doctor sent her to the ED for blood pressure control. She denies any symptoms including fever, headache, lightheadedness, chest pain, shortness of breath, palpitations, nausea, vomiting, abdominal pain, and leg swelling.  Patient admits to forgetting to take her blood pressure medications on 5/23/18.    In the ED, BP was measured to be 214 / 100. After 10mg of amlodipine it was 205 / 87.  She was then given 25mg HCTZ, 5mg IV vasotec, and 10mg IV labetalol. Repeat BP after these medications was not documented. (25 May 2018 01:05)      PAST MEDICAL & SURGICAL HISTORY:  Seizure disorder  Alzheimer's dementia without behavioral disturbance, unspecified timing of dementia onset  Hypercholesteremia  HTN (hypertension)  History of craniotomy      PREVIOUS DIAGNOSTIC TESTING:      ECHO  FINDINGS:     STRESS TEST  FINDINGS:    CATHETERIZATION  FINDINGS:    MEDICATIONS  (STANDING):  carBAMazepine XR Tablet 200 milliGRAM(s) Oral daily  donepezil 10 milliGRAM(s) Oral at bedtime  escitalopram 20 milliGRAM(s) Oral daily  folic acid 1 milliGRAM(s) Oral daily  hydrALAZINE 25 milliGRAM(s) Oral every 8 hours  hydrochlorothiazide 25 milliGRAM(s) Oral daily  losartan 100 milliGRAM(s) Oral daily  memantine 10 milliGRAM(s) Oral every 12 hours  metoprolol tartrate 100 milliGRAM(s) Oral <User Schedule>  metoprolol tartrate 50 milliGRAM(s) Oral <User Schedule>  simvastatin 20 milliGRAM(s) Oral at bedtime  thiamine 100 milliGRAM(s) Oral daily    MEDICATIONS  (PRN):  LORazepam     Tablet 1 milliGRAM(s) Oral every 2 hours PRN CIWA-Ar score 8 or greater      FAMILY HISTORY:  No pertinent family history in first degree relatives      SOCIAL HISTORY:  CIGARETTES:  ALCOHOL:  DRUGS:                      REVIEW OF SYSTEMS:  CONSTITUTIONAL: No distress        Vital Signs Last 24 Hrs  T(C): 35.8 (27 May 2018 00:33), Max: 36.6 (26 May 2018 21:19)  T(F): 96.5 (27 May 2018 00:33), Max: 97.9 (26 May 2018 21:19)  HR: 72 (27 May 2018 10:02) (61 - 72)  BP: 169/94 (27 May 2018 10:02) (169/94 - 185/85)  BP(mean): --  RR: 20 (27 May 2018 00:33) (20 - 20)  SpO2: --                      PHYSICAL EXAM:  GENERAL: No distress, well developed  HEAD:  Atraumatic, Normocephalic  NECK: Supple, No JVD, No Bruit of either carotid arteries  NERVOUS SYSTEM:  Alert, Awake, Oriented to time, place, person; Normal memory and speech; Normal motor Strength 5/5 B/L upper and lower extremities  CHEST/LUNG: Normal air entry to lung base bilaterally; No wheeze, crackle, rales, rhonchi  HEART: Regular heart beat, S1, A2, P2, No S3, No S4, No gallop, No murmur  ABDOMEN: Soft, Non tender, Non distended; Bowel sounds present  EXTREMITIES:  2+ Peripheral Pulses, No clubbing, No edema  SKIN: No rashes or lesions    TELEMETRY:    ECG: < from: 12 Lead ECG (05.24.18 @ 17:14) >  Sinus bradycardia  Anteroseptal infarct , age undetermined    < end of copied text >      I&O's Detail    26 May 2018 07:01  -  27 May 2018 07:00  --------------------------------------------------------  IN:  Total IN: 0 mL    OUT:    Voided: 2 mL  Total OUT: 2 mL    Total NET: -2 mL          LABS:    05-25    140  |  98  |  15  ----------------------------<  97  3.4<L>   |  27  |  0.9    Ca    9.6      25 May 2018 22:12  Phos  4.4     05-25  Mg     2.1     05-25    TPro  5.9<L>  /  Alb  4.0  /  TBili  0.4  /  DBili  <0.2  /  AST  15  /  ALT  10  /  AlkPhos  66  05-25    CARDIAC MARKERS ( 25 May 2018 11:14 )  x     / <0.01 ng/mL / 145 U/L / x     / x              I&O's Summary    26 May 2018 07:01  -  27 May 2018 07:00  --------------------------------------------------------  IN: 0 mL / OUT: 2 mL / NET: -2 mL        RADIOLOGY & ADDITIONAL STUDIES: < from: Xray Chest 2 Views PA/Lat (05.24.18 @ 19:02) >  No radiographic evidence of acute cardiopulmonary disease.    < end of copied text > Patient is a 72y old  Female who presents with a chief complaint of Uncontrolled blood pressure. (25 May 2018 11:48)      HPI: Patient son called me and asked to see his mother. She has been a patient of our office (Dr. Lara)    73 y/o F with PMH of hypertension, alzheimer's dementia, and seizure disorder was sent in by her primary care physician for uncontrolled hypertension.  Both the patient and her  were poor historians, but stated for the past month or so, her blood pressure has been higher than usual. Her metoprolol was recently increased 2 weeks ago without improvement. In the office today her blood pressure was 230s / 110s, so her doctor sent her to the ED for blood pressure control. She denies any symptoms including fever, headache, lightheadedness, chest pain, shortness of breath, palpitations, nausea, vomiting, abdominal pain, and leg swelling.  Patient admits to forgetting to take her blood pressure medications on 5/23/18.    In the ED, BP was measured to be 214 / 100. After 10mg of amlodipine it was 205 / 87.  She was then given 25mg HCTZ, 5mg IV vasotec, and 10mg IV labetalol. Repeat BP after these medications was not documented. (25 May 2018 01:05)      PAST MEDICAL & SURGICAL HISTORY:  Seizure disorder  Alzheimer's dementia without behavioral disturbance, unspecified timing of dementia onset  Hypercholesteremia  HTN (hypertension)  History of craniotomy      PREVIOUS DIAGNOSTIC TESTING:      ECHO  FINDINGS: no echo done in the hospital    STRESS TEST  FINDINGS:    CATHETERIZATION  FINDINGS:    MEDICATIONS  (STANDING):  carBAMazepine XR Tablet 200 milliGRAM(s) Oral daily  donepezil 10 milliGRAM(s) Oral at bedtime  escitalopram 20 milliGRAM(s) Oral daily  folic acid 1 milliGRAM(s) Oral daily  hydrALAZINE 25 milliGRAM(s) Oral every 8 hours  hydrochlorothiazide 25 milliGRAM(s) Oral daily  losartan 100 milliGRAM(s) Oral daily  memantine 10 milliGRAM(s) Oral every 12 hours  metoprolol tartrate 100 milliGRAM(s) Oral <User Schedule>  metoprolol tartrate 50 milliGRAM(s) Oral <User Schedule>  simvastatin 20 milliGRAM(s) Oral at bedtime  thiamine 100 milliGRAM(s) Oral daily    MEDICATIONS  (PRN):  LORazepam     Tablet 1 milliGRAM(s) Oral every 2 hours PRN CIWA-Ar score 8 or greater      FAMILY HISTORY:  No pertinent family history in first degree relatives      SOCIAL HISTORY:  CIGARETTES: No  ALCOHOL: No  DRUGS: No                      REVIEW OF SYSTEMS:  CONSTITUTIONAL: No distress, no chest pain, no sob, no palpitation, no dizziness, no light headedness, no abdominal pain or nausea/vomiting        Vital Signs Last 24 Hrs  T(C): 35.8 (27 May 2018 00:33), Max: 36.6 (26 May 2018 21:19)  T(F): 96.5 (27 May 2018 00:33), Max: 97.9 (26 May 2018 21:19)  HR: 72 (27 May 2018 10:02) (61 - 72)  BP: 169/94 (27 May 2018 10:02) (169/94 - 185/85)  BP(mean): --  RR: 20 (27 May 2018 00:33) (20 - 20)  SpO2: --                      PHYSICAL EXAM:  GENERAL: generally stable, sitting in the [address], in no distress, well developed  HEAD:  Atraumatic, Normocephalic  NECK: Supple, No JVD, No Bruit of either carotid arteries  NERVOUS SYSTEM:  Alert, Awake, Oriented to place; admits having limited memory but still able to communicate well and remembers being confused in the room, that was why she was in the hallway  CHEST/LUNG: Normal air entry to lung base bilaterally; No wheeze, crackle, rales, rhonchi  HEART: Regular heart beat, S1, A2, P2, No S3, No gallop  ABDOMEN: Soft, Non tender, Non distended; Bowel sounds present  EXTREMITIES:  2+ Peripheral Pulses, No clubbing, No edema  SKIN: No rashes or lesions    TELEMETRY:    ECG: < from: 12 Lead ECG (05.24.18 @ 17:14) >  Sinus bradycardia  Anteroseptal infarct , age undetermined    < end of copied text >      I&O's Detail    26 May 2018 07:01  -  27 May 2018 07:00  --------------------------------------------------------  IN:  Total IN: 0 mL    OUT:    Voided: 2 mL  Total OUT: 2 mL    Total NET: -2 mL          LABS:    05-25    140  |  98  |  15  ----------------------------<  97  3.4<L>   |  27  |  0.9    Ca    9.6      25 May 2018 22:12  Phos  4.4     05-25  Mg     2.1     05-25    TPro  5.9<L>  /  Alb  4.0  /  TBili  0.4  /  DBili  <0.2  /  AST  15  /  ALT  10  /  AlkPhos  66  05-25    CARDIAC MARKERS ( 25 May 2018 11:14 )  x     / <0.01 ng/mL / 145 U/L / x     / x              I&O's Summary    26 May 2018 07:01  -  27 May 2018 07:00  --------------------------------------------------------  IN: 0 mL / OUT: 2 mL / NET: -2 mL        RADIOLOGY & ADDITIONAL STUDIES: < from: Xray Chest 2 Views PA/Lat (05.24.18 @ 19:02) >  No radiographic evidence of acute cardiopulmonary disease.    < end of copied text >

## 2018-05-28 VITALS — OXYGEN SATURATION: 96 %

## 2018-05-28 RX ORDER — LABETALOL HCL 100 MG
1 TABLET ORAL
Qty: 60 | Refills: 0
Start: 2018-05-28 | End: 2018-06-26

## 2018-05-28 RX ORDER — NIFEDIPINE 30 MG
1 TABLET, EXTENDED RELEASE 24 HR ORAL
Qty: 30 | Refills: 0
Start: 2018-05-28 | End: 2018-06-26

## 2018-05-28 RX ORDER — NIFEDIPINE 30 MG
1 TABLET, EXTENDED RELEASE 24 HR ORAL
Qty: 30 | Refills: 0 | OUTPATIENT
Start: 2018-05-28 | End: 2018-06-26

## 2018-05-28 RX ADMIN — Medication 200 MILLIGRAM(S): at 11:35

## 2018-05-28 RX ADMIN — Medication 100 MILLIGRAM(S): at 11:35

## 2018-05-28 RX ADMIN — VALSARTAN 320 MILLIGRAM(S): 80 TABLET ORAL at 06:25

## 2018-05-28 RX ADMIN — ESCITALOPRAM OXALATE 20 MILLIGRAM(S): 10 TABLET, FILM COATED ORAL at 11:35

## 2018-05-28 RX ADMIN — Medication 1 MILLIGRAM(S): at 11:35

## 2018-05-28 RX ADMIN — Medication 100 MILLIGRAM(S): at 06:25

## 2018-05-28 RX ADMIN — MEMANTINE HYDROCHLORIDE 10 MILLIGRAM(S): 10 TABLET ORAL at 06:25

## 2018-05-28 RX ADMIN — Medication 12.5 MILLIGRAM(S): at 06:26

## 2018-05-28 NOTE — PROGRESS NOTE ADULT - SUBJECTIVE AND OBJECTIVE BOX
Patient was seen and examined. Spoke with RN and . Chart reviewed.  No events overnight.  Vital Signs Last 24 Hrs  T(F): 97 (28 May 2018 06:05), Max: 97.6 (27 May 2018 14:55)  HR: 67 (28 May 2018 06:05) (65 - 72)  BP: 132/67 (28 May 2018 06:05) (117/62 - 169/94)  SpO2: 98% (28 May 2018 04:21) (98% - 98%)  MEDICATIONS  (STANDING):  carBAMazepine XR Tablet 200 milliGRAM(s) Oral daily  donepezil 10 milliGRAM(s) Oral at bedtime  escitalopram 20 milliGRAM(s) Oral daily  folic acid 1 milliGRAM(s) Oral daily  hydrochlorothiazide 12.5 milliGRAM(s) Oral daily  labetalol 100 milliGRAM(s) Oral two times a day  memantine 10 milliGRAM(s) Oral every 12 hours  NIFEdipine XL 60 milliGRAM(s) Oral at bedtime  simvastatin 20 milliGRAM(s) Oral at bedtime  thiamine 100 milliGRAM(s) Oral daily  valsartan 320 milliGRAM(s) Oral daily    MEDICATIONS  (PRN):  LORazepam     Tablet 1 milliGRAM(s) Oral every 2 hours PRN CIWA-Ar score 8 or greater    Labs:    27 May 2018 07:22    142    |  99     |  15     ----------------------------<  104    3.2     |  26     |  0.7      Ca    9.7        27 May 2018 07:22            General: comfortable, NAD  Neurology:  nonfocal  Head:  Normocephalic, atraumatic  ENT:  Mucosa moist, no ulcerations  Neck:  Supple, no JVD,   Skin: no breakdowns (as per RN)  Resp: CTA B/L  CV: RRR, S1S2,   GI: Soft, NT, bowel sounds  MS: No edema, + peripheral pulses, FROM all 4 extremity      A/P:  HTN much improved  cardio f/u    continue new medication regimen as per cardio yesterday    DC today if ok with cardio  DVT prophylaxis  Decubitus prevention- all measures as per RN protocol  Please call or text me with any questions or updates

## 2018-05-28 NOTE — PROGRESS NOTE ADULT - SUBJECTIVE AND OBJECTIVE BOX
Subjective:  i saw her today in the presence of her . she feels well, has no [id number], still able to communicate, regardless of limited memory, bp responded well to current sets of medication    MEDICATIONS  (STANDING):  carBAMazepine XR Tablet 200 milliGRAM(s) Oral daily  donepezil 10 milliGRAM(s) Oral at bedtime  escitalopram 20 milliGRAM(s) Oral daily  folic acid 1 milliGRAM(s) Oral daily  hydrochlorothiazide 12.5 milliGRAM(s) Oral daily  labetalol 100 milliGRAM(s) Oral two times a day  memantine 10 milliGRAM(s) Oral every 12 hours  NIFEdipine XL 60 milliGRAM(s) Oral at bedtime  simvastatin 20 milliGRAM(s) Oral at bedtime  thiamine 100 milliGRAM(s) Oral daily  valsartan 320 milliGRAM(s) Oral daily    MEDICATIONS  (PRN):            Vital Signs Last 24 Hrs  T(C): 36.1 (28 May 2018 06:05), Max: 36.4 (27 May 2018 14:55)  T(F): 97 (28 May 2018 06:05), Max: 97.6 (27 May 2018 14:55)  HR: 67 (28 May 2018 06:05) (65 - 67)  BP: 132/67 (28 May 2018 06:05) (117/62 - 162/74)  BP(mean): --  RR: 18 (28 May 2018 06:05) (18 - 18)  SpO2: 96% (28 May 2018 09:50) (96% - 98%)             REVIEW OF SYSTEMS:  CONSTITUTIONAL: no fever, no chills, no diaphoresis  CARDIOLOGY: no chest pain, no SOB, no palpitation   RESPIRATORY: no dyspnea   NEUROLOGICAL: memory is limited  GI: no abdominal pain, no dyspepsia    HEENT: no congestion, no nasal bleeding  SKIN: no ecchymosis, no ptechia             PHYSICAL EXAM:  · CONSTITUTIONAL: Looks stable, in no diress  . NECK: Supple, no JVD   · RESPIRATORY: Normal air entry to lung base  · CARDIOVASCULAR: Normal S1, A2, P2, no murmur  · EXTREMITIES: No cyanosis, no clubbing, no edema  · VASCULAR: Pulses are regular, equal  	  ECG:< from: 12 Lead ECG (05.24.18 @ 17:14) >  Sinus bradycardia  Anteroseptal infarct , age undetermined    < end of copied text >      LABS:    05-27    142  |  99  |  15  ----------------------------<  104<H>  3.2<L>   |  26  |  0.7    Ca    9.7      27 May 2018 07:22              I&O's Summary    BNP  RADIOLOGY & ADDITIONAL STUDIES:    IMPRESSION AND PLAN:

## 2018-05-28 NOTE — PROGRESS NOTE ADULT - ASSESSMENT
uncontrolled HTN, responded well to current therapy   as i mentioned in previous note D/C her previous hypertension medications  upon d/c from hospital this will be the list of meds: Labetalol 100 bid, Valsartan-HCTZ 320-12.5 at AM and Nifedipine xl 60 mg at PM, follow up in 2-3 weeks with Dr. Lara at

## 2018-05-31 DIAGNOSIS — F10.239 ALCOHOL DEPENDENCE WITH WITHDRAWAL, UNSPECIFIED: ICD-10-CM

## 2018-05-31 DIAGNOSIS — I16.0 HYPERTENSIVE URGENCY: ICD-10-CM

## 2018-05-31 DIAGNOSIS — G40.909 EPILEPSY, UNSPECIFIED, NOT INTRACTABLE, WITHOUT STATUS EPILEPTICUS: ICD-10-CM

## 2018-05-31 DIAGNOSIS — Z87.891 PERSONAL HISTORY OF NICOTINE DEPENDENCE: ICD-10-CM

## 2018-05-31 DIAGNOSIS — G30.0 ALZHEIMER'S DISEASE WITH EARLY ONSET: ICD-10-CM

## 2018-05-31 DIAGNOSIS — E78.5 HYPERLIPIDEMIA, UNSPECIFIED: ICD-10-CM

## 2018-05-31 DIAGNOSIS — F02.80 DEMENTIA IN OTHER DISEASES CLASSIFIED ELSEWHERE, UNSPECIFIED SEVERITY, WITHOUT BEHAVIORAL DISTURBANCE, PSYCHOTIC DISTURBANCE, MOOD DISTURBANCE, AND ANXIETY: ICD-10-CM

## 2018-09-14 ENCOUNTER — OUTPATIENT (OUTPATIENT)
Dept: OUTPATIENT SERVICES | Facility: HOSPITAL | Age: 73
LOS: 1 days | Discharge: HOME | End: 2018-09-14

## 2018-09-14 DIAGNOSIS — Z12.31 ENCOUNTER FOR SCREENING MAMMOGRAM FOR MALIGNANT NEOPLASM OF BREAST: ICD-10-CM

## 2018-09-14 DIAGNOSIS — Z98.890 OTHER SPECIFIED POSTPROCEDURAL STATES: Chronic | ICD-10-CM

## 2018-09-14 PROBLEM — G40.909 EPILEPSY, UNSPECIFIED, NOT INTRACTABLE, WITHOUT STATUS EPILEPTICUS: Chronic | Status: ACTIVE | Noted: 2018-05-25

## 2018-09-14 PROBLEM — I10 ESSENTIAL (PRIMARY) HYPERTENSION: Chronic | Status: ACTIVE | Noted: 2018-05-24

## 2018-09-14 PROBLEM — G30.9 ALZHEIMER'S DISEASE, UNSPECIFIED: Chronic | Status: ACTIVE | Noted: 2018-05-25

## 2018-09-14 PROBLEM — E78.00 PURE HYPERCHOLESTEROLEMIA, UNSPECIFIED: Chronic | Status: ACTIVE | Noted: 2018-05-24

## 2019-01-14 ENCOUNTER — OUTPATIENT (OUTPATIENT)
Dept: OUTPATIENT SERVICES | Facility: HOSPITAL | Age: 74
LOS: 1 days | Discharge: HOME | End: 2019-01-14

## 2019-01-14 DIAGNOSIS — Z98.890 OTHER SPECIFIED POSTPROCEDURAL STATES: Chronic | ICD-10-CM

## 2019-01-14 DIAGNOSIS — N39.0 URINARY TRACT INFECTION, SITE NOT SPECIFIED: ICD-10-CM

## 2019-04-11 PROBLEM — Z00.00 ENCOUNTER FOR PREVENTIVE HEALTH EXAMINATION: Status: ACTIVE | Noted: 2019-04-11

## 2019-05-09 ENCOUNTER — APPOINTMENT (OUTPATIENT)
Dept: NEUROLOGY | Facility: CLINIC | Age: 74
End: 2019-05-09
Payer: MEDICARE

## 2019-05-09 PROCEDURE — 99214 OFFICE O/P EST MOD 30 MIN: CPT

## 2019-06-13 ENCOUNTER — APPOINTMENT (OUTPATIENT)
Dept: NEUROLOGY | Facility: CLINIC | Age: 74
End: 2019-06-13
Payer: MEDICARE

## 2019-06-13 VITALS
WEIGHT: 116 LBS | SYSTOLIC BLOOD PRESSURE: 130 MMHG | TEMPERATURE: 98.2 F | DIASTOLIC BLOOD PRESSURE: 76 MMHG | HEIGHT: 63 IN | OXYGEN SATURATION: 99 % | BODY MASS INDEX: 20.55 KG/M2 | HEART RATE: 72 BPM

## 2019-06-13 DIAGNOSIS — G30.9 ALZHEIMER'S DISEASE, UNSPECIFIED: ICD-10-CM

## 2019-06-13 DIAGNOSIS — F02.80 ALZHEIMER'S DISEASE, UNSPECIFIED: ICD-10-CM

## 2019-06-13 PROCEDURE — 99214 OFFICE O/P EST MOD 30 MIN: CPT

## 2019-06-13 RX ORDER — SIMVASTATIN 20 MG/1
20 TABLET, FILM COATED ORAL DAILY
Refills: 0 | Status: ACTIVE | COMMUNITY

## 2019-06-13 RX ORDER — METOPROLOL TARTRATE 50 MG/1
50 TABLET, FILM COATED ORAL DAILY
Refills: 0 | Status: ACTIVE | COMMUNITY

## 2019-06-13 RX ORDER — CARBAMAZEPINE 200 MG/1
200 TABLET ORAL TWICE DAILY
Refills: 0 | Status: ACTIVE | COMMUNITY

## 2019-06-13 RX ORDER — MEMANTINE HYDROCHLORIDE 28 MG/1
28 CAPSULE, EXTENDED RELEASE ORAL
Refills: 0 | Status: ACTIVE | COMMUNITY

## 2019-06-13 NOTE — PHYSICAL EXAM
[FreeTextEntry1] : Anyi appears a slightly apprehensive her vocabulary is slightly more limited she does have some degree of confabulation and clearly has difficulties with processing to conversation there she cleared he also lost weight. The cranial nerve exam is normal she does have a slightly scoliotic posture and mesh she stands up she needed her 's help the gait is a stable

## 2019-06-13 NOTE — ASSESSMENT
[FreeTextEntry1] : Anyi is here for follow. She carries the diagnoses of uterine cancer status post hysterectomy and also she is status post meningioma resection many years ago he relates history of seizure disorder after that never report of a clinical seizure.\par She has been having a cognitive decline for the last couple of years and now it appears that she is suffering from a moderate degree of dementia.\par At this point the main issue are episodic events of confusion that does not appear to be epileptic in nature it is more of the relates it to the situation. She does have difficulty with her logic and decision-making as well. We will continue her current dose of medications I will start her on a small dose of seraquel  and I will see her in followup and in between to speak on the phone I have also asked him to see the surgeon because of her weight loss but this appears to be out of proportion with the diet or type of

## 2019-06-13 NOTE — HISTORY OF PRESENT ILLNESS
[FreeTextEntry1] : Anyi is here for followup she carries a diagnosis of moderate dementia and also she is status post hysterectomy for malignant uterus disease. In the past I was seeing her for questionable seizure disorder and also a slight mood disorder. She was status post left craniotomy for a meningioma resection many years ago before I see her.\par At this point the main issue that is making their life very difficult to this her dementia that is progressing reasonably fast. No among the conversations that I hear she does have multiple episodes of confusion and at times she reports seeing people. She calls people on the phone during inappropriate time and she also occasionally does not recognize her  her vocabulary is becoming a slightly limited. Overall his gait pattern is good. Unfortunately now that I see her compared to even a month ago she had lost significant amount of weight. I doubt that this is related to her diet at this point my concern is her uterine cancer I have asked the  to make sure that they are going back to see the surgeon\par The skin she is capable of taking care of herself although she has a slight resistance to taking shower\par She'll occasionally have periods of trembling and shaking and acting panicky and anxious this was the reason that they were giving her Xanax as well.

## 2019-08-08 ENCOUNTER — INPATIENT (INPATIENT)
Facility: HOSPITAL | Age: 74
LOS: 10 days | Discharge: NURSING, REHAB OR LONG-TERM CARE | End: 2019-08-19
Attending: INTERNAL MEDICINE | Admitting: INTERNAL MEDICINE
Payer: MEDICARE

## 2019-08-08 VITALS
TEMPERATURE: 97 F | RESPIRATION RATE: 18 BRPM | OXYGEN SATURATION: 98 % | SYSTOLIC BLOOD PRESSURE: 145 MMHG | HEART RATE: 82 BPM | DIASTOLIC BLOOD PRESSURE: 70 MMHG

## 2019-08-08 DIAGNOSIS — Z98.890 OTHER SPECIFIED POSTPROCEDURAL STATES: Chronic | ICD-10-CM

## 2019-08-08 LAB
ALBUMIN SERPL ELPH-MCNC: 4.9 G/DL — SIGNIFICANT CHANGE UP (ref 3.5–5.2)
ALP SERPL-CCNC: 92 U/L — SIGNIFICANT CHANGE UP (ref 30–115)
ALT FLD-CCNC: 14 U/L — SIGNIFICANT CHANGE UP (ref 0–41)
ANION GAP SERPL CALC-SCNC: 11 MMOL/L — SIGNIFICANT CHANGE UP (ref 7–14)
APPEARANCE UR: CLEAR — SIGNIFICANT CHANGE UP
AST SERPL-CCNC: 19 U/L — SIGNIFICANT CHANGE UP (ref 0–41)
BACTERIA # UR AUTO: NEGATIVE — SIGNIFICANT CHANGE UP
BASOPHILS # BLD AUTO: 0.03 K/UL — SIGNIFICANT CHANGE UP (ref 0–0.2)
BASOPHILS NFR BLD AUTO: 0.5 % — SIGNIFICANT CHANGE UP (ref 0–1)
BILIRUB SERPL-MCNC: 0.3 MG/DL — SIGNIFICANT CHANGE UP (ref 0.2–1.2)
BILIRUB UR-MCNC: NEGATIVE — SIGNIFICANT CHANGE UP
BUN SERPL-MCNC: 17 MG/DL — SIGNIFICANT CHANGE UP (ref 10–20)
CALCIUM SERPL-MCNC: 9.7 MG/DL — SIGNIFICANT CHANGE UP (ref 8.5–10.1)
CHLORIDE SERPL-SCNC: 102 MMOL/L — SIGNIFICANT CHANGE UP (ref 98–110)
CO2 SERPL-SCNC: 29 MMOL/L — SIGNIFICANT CHANGE UP (ref 17–32)
COLOR SPEC: YELLOW — SIGNIFICANT CHANGE UP
CREAT SERPL-MCNC: 0.8 MG/DL — SIGNIFICANT CHANGE UP (ref 0.7–1.5)
DIFF PNL FLD: NEGATIVE — SIGNIFICANT CHANGE UP
EOSINOPHIL # BLD AUTO: 0.05 K/UL — SIGNIFICANT CHANGE UP (ref 0–0.7)
EOSINOPHIL NFR BLD AUTO: 0.8 % — SIGNIFICANT CHANGE UP (ref 0–8)
EPI CELLS # UR: 3 /HPF — SIGNIFICANT CHANGE UP (ref 0–5)
GLUCOSE SERPL-MCNC: 111 MG/DL — HIGH (ref 70–99)
GLUCOSE UR QL: NEGATIVE — SIGNIFICANT CHANGE UP
HCT VFR BLD CALC: 41 % — SIGNIFICANT CHANGE UP (ref 37–47)
HGB BLD-MCNC: 14.1 G/DL — SIGNIFICANT CHANGE UP (ref 12–16)
HYALINE CASTS # UR AUTO: 3 /LPF — SIGNIFICANT CHANGE UP (ref 0–7)
IMM GRANULOCYTES NFR BLD AUTO: 0.3 % — SIGNIFICANT CHANGE UP (ref 0.1–0.3)
KETONES UR-MCNC: NEGATIVE — SIGNIFICANT CHANGE UP
LEUKOCYTE ESTERASE UR-ACNC: ABNORMAL
LYMPHOCYTES # BLD AUTO: 1.33 K/UL — SIGNIFICANT CHANGE UP (ref 1.2–3.4)
LYMPHOCYTES # BLD AUTO: 20.4 % — LOW (ref 20.5–51.1)
MCHC RBC-ENTMCNC: 33 PG — HIGH (ref 27–31)
MCHC RBC-ENTMCNC: 34.4 G/DL — SIGNIFICANT CHANGE UP (ref 32–37)
MCV RBC AUTO: 96 FL — SIGNIFICANT CHANGE UP (ref 81–99)
MONOCYTES # BLD AUTO: 0.36 K/UL — SIGNIFICANT CHANGE UP (ref 0.1–0.6)
MONOCYTES NFR BLD AUTO: 5.5 % — SIGNIFICANT CHANGE UP (ref 1.7–9.3)
NEUTROPHILS # BLD AUTO: 4.74 K/UL — SIGNIFICANT CHANGE UP (ref 1.4–6.5)
NEUTROPHILS NFR BLD AUTO: 72.5 % — SIGNIFICANT CHANGE UP (ref 42.2–75.2)
NITRITE UR-MCNC: NEGATIVE — SIGNIFICANT CHANGE UP
NRBC # BLD: 0 /100 WBCS — SIGNIFICANT CHANGE UP (ref 0–0)
PH UR: 6 — SIGNIFICANT CHANGE UP (ref 5–8)
PLATELET # BLD AUTO: 235 K/UL — SIGNIFICANT CHANGE UP (ref 130–400)
POTASSIUM SERPL-MCNC: 4.1 MMOL/L — SIGNIFICANT CHANGE UP (ref 3.5–5)
POTASSIUM SERPL-SCNC: 4.1 MMOL/L — SIGNIFICANT CHANGE UP (ref 3.5–5)
PROT SERPL-MCNC: 7.1 G/DL — SIGNIFICANT CHANGE UP (ref 6–8)
PROT UR-MCNC: SIGNIFICANT CHANGE UP
RBC # BLD: 4.27 M/UL — SIGNIFICANT CHANGE UP (ref 4.2–5.4)
RBC # FLD: 13.1 % — SIGNIFICANT CHANGE UP (ref 11.5–14.5)
RBC CASTS # UR COMP ASSIST: 3 /HPF — SIGNIFICANT CHANGE UP (ref 0–4)
SODIUM SERPL-SCNC: 142 MMOL/L — SIGNIFICANT CHANGE UP (ref 135–146)
SP GR SPEC: 1.02 — SIGNIFICANT CHANGE UP (ref 1.01–1.02)
UROBILINOGEN FLD QL: SIGNIFICANT CHANGE UP
WBC # BLD: 6.53 K/UL — SIGNIFICANT CHANGE UP (ref 4.8–10.8)
WBC # FLD AUTO: 6.53 K/UL — SIGNIFICANT CHANGE UP (ref 4.8–10.8)
WBC UR QL: 9 /HPF — HIGH (ref 0–5)

## 2019-08-08 PROCEDURE — 71045 X-RAY EXAM CHEST 1 VIEW: CPT | Mod: 26

## 2019-08-08 PROCEDURE — 70450 CT HEAD/BRAIN W/O DYE: CPT | Mod: 26

## 2019-08-08 PROCEDURE — 99285 EMERGENCY DEPT VISIT HI MDM: CPT | Mod: GC

## 2019-08-08 PROCEDURE — 93010 ELECTROCARDIOGRAM REPORT: CPT

## 2019-08-08 RX ORDER — CEFTRIAXONE 500 MG/1
1000 INJECTION, POWDER, FOR SOLUTION INTRAMUSCULAR; INTRAVENOUS ONCE
Refills: 0 | Status: COMPLETED | OUTPATIENT
Start: 2019-08-08 | End: 2019-08-08

## 2019-08-08 RX ADMIN — CEFTRIAXONE 100 MILLIGRAM(S): 500 INJECTION, POWDER, FOR SOLUTION INTRAMUSCULAR; INTRAVENOUS at 23:55

## 2019-08-08 NOTE — ED PROVIDER NOTE - ATTENDING CONTRIBUTION TO CARE
I personally evaluated the patient. I reviewed the Resident’s or Physician Assistant’s note (as assigned above), and agree with the findings and plan except as documented in my note.    Pt is a 74 y/o female with hx of alzhemiers dementia, HTN, DLD, presents to ED for worsening dementia over the past several years, worse over the past 3 days. Sx's are mild, constant, worse since onset. No n/v/d, fever, chest pain, SOB, abd pain. Family has aid 3 x/week at home but that is not enough.     Constitutional: Well developed, well nourished. NAD.  Head: Normocephalic, atraumatic.  Eyes: PERRL. EOMI.  ENT: No nasal discharge. Mucous membranes moist.  Neck: Supple. Painless ROM.  Cardiovascular: Normal S1, S2. Regular rate and rhythm. No murmurs, rubs, or gallops.  Pulmonary: Normal respiratory rate and effort. Lungs clear to auscultation bilaterally. No wheezing, rales, or rhonchi.  Abdominal: Soft. Nondistended. Nontender. No rebound, guarding, rigidity.  Extremities. Pelvis stable. No lower extremity edema, symmetric calves.  Skin: No rashes, cyanosis.  Neuro: AAOx2. No focal neurological deficits.  Psych: Normal mood. Normal affect.

## 2019-08-08 NOTE — ED PROVIDER NOTE - OBJECTIVE STATEMENT
73y F w/ PMH of HTN, HLD, and Alzheimer's dementia presents with increasing dementia. Per family, she's been getting increasingly agitated and have increased moments of not recognizing family/environment. Family states that at this time, they are uncomfortable of taking care of pt at home. Pt has been getting actively followed by Dr. Parks, neurologist. Denies fever, chills, CP, SOB, abd pain, or dysuria.

## 2019-08-08 NOTE — ED PROVIDER NOTE - CARE PLAN
Principal Discharge DX:	Alzheimer's dementia without behavioral disturbance, unspecified timing of dementia onset Principal Discharge DX:	Alzheimer's dementia without behavioral disturbance, unspecified timing of dementia onset  Secondary Diagnosis:	UTI (urinary tract infection)

## 2019-08-09 DIAGNOSIS — Z90.710 ACQUIRED ABSENCE OF BOTH CERVIX AND UTERUS: Chronic | ICD-10-CM

## 2019-08-09 LAB
ALBUMIN SERPL ELPH-MCNC: 4.6 G/DL — SIGNIFICANT CHANGE UP (ref 3.5–5.2)
ALP SERPL-CCNC: 85 U/L — SIGNIFICANT CHANGE UP (ref 30–115)
ALT FLD-CCNC: 14 U/L — SIGNIFICANT CHANGE UP (ref 0–41)
ANION GAP SERPL CALC-SCNC: 19 MMOL/L — HIGH (ref 7–14)
AST SERPL-CCNC: 19 U/L — SIGNIFICANT CHANGE UP (ref 0–41)
BASOPHILS # BLD AUTO: 0.01 K/UL — SIGNIFICANT CHANGE UP (ref 0–0.2)
BASOPHILS NFR BLD AUTO: 0.1 % — SIGNIFICANT CHANGE UP (ref 0–1)
BILIRUB SERPL-MCNC: 0.5 MG/DL — SIGNIFICANT CHANGE UP (ref 0.2–1.2)
BUN SERPL-MCNC: 13 MG/DL — SIGNIFICANT CHANGE UP (ref 10–20)
CALCIUM SERPL-MCNC: 9.9 MG/DL — SIGNIFICANT CHANGE UP (ref 8.5–10.1)
CHLORIDE SERPL-SCNC: 102 MMOL/L — SIGNIFICANT CHANGE UP (ref 98–110)
CO2 SERPL-SCNC: 21 MMOL/L — SIGNIFICANT CHANGE UP (ref 17–32)
CREAT SERPL-MCNC: 0.7 MG/DL — SIGNIFICANT CHANGE UP (ref 0.7–1.5)
EOSINOPHIL # BLD AUTO: 0.01 K/UL — SIGNIFICANT CHANGE UP (ref 0–0.7)
EOSINOPHIL NFR BLD AUTO: 0.1 % — SIGNIFICANT CHANGE UP (ref 0–8)
GLUCOSE SERPL-MCNC: 137 MG/DL — HIGH (ref 70–99)
HCT VFR BLD CALC: 41.9 % — SIGNIFICANT CHANGE UP (ref 37–47)
HGB BLD-MCNC: 14.6 G/DL — SIGNIFICANT CHANGE UP (ref 12–16)
IMM GRANULOCYTES NFR BLD AUTO: 0.4 % — HIGH (ref 0.1–0.3)
LYMPHOCYTES # BLD AUTO: 1.57 K/UL — SIGNIFICANT CHANGE UP (ref 1.2–3.4)
LYMPHOCYTES # BLD AUTO: 15.3 % — LOW (ref 20.5–51.1)
MAGNESIUM SERPL-MCNC: 2.2 MG/DL — SIGNIFICANT CHANGE UP (ref 1.8–2.4)
MCHC RBC-ENTMCNC: 33.2 PG — HIGH (ref 27–31)
MCHC RBC-ENTMCNC: 34.8 G/DL — SIGNIFICANT CHANGE UP (ref 32–37)
MCV RBC AUTO: 95.2 FL — SIGNIFICANT CHANGE UP (ref 81–99)
MONOCYTES # BLD AUTO: 0.62 K/UL — HIGH (ref 0.1–0.6)
MONOCYTES NFR BLD AUTO: 6 % — SIGNIFICANT CHANGE UP (ref 1.7–9.3)
NEUTROPHILS # BLD AUTO: 8.04 K/UL — HIGH (ref 1.4–6.5)
NEUTROPHILS NFR BLD AUTO: 78.1 % — HIGH (ref 42.2–75.2)
NRBC # BLD: 0 /100 WBCS — SIGNIFICANT CHANGE UP (ref 0–0)
PLATELET # BLD AUTO: 215 K/UL — SIGNIFICANT CHANGE UP (ref 130–400)
POTASSIUM SERPL-MCNC: 3.6 MMOL/L — SIGNIFICANT CHANGE UP (ref 3.5–5)
POTASSIUM SERPL-SCNC: 3.6 MMOL/L — SIGNIFICANT CHANGE UP (ref 3.5–5)
PROT SERPL-MCNC: 7.3 G/DL — SIGNIFICANT CHANGE UP (ref 6–8)
RBC # BLD: 4.4 M/UL — SIGNIFICANT CHANGE UP (ref 4.2–5.4)
RBC # FLD: 13.1 % — SIGNIFICANT CHANGE UP (ref 11.5–14.5)
SODIUM SERPL-SCNC: 142 MMOL/L — SIGNIFICANT CHANGE UP (ref 135–146)
TSH SERPL-MCNC: 4.34 UIU/ML — HIGH (ref 0.27–4.2)
VIT B12 SERPL-MCNC: 517 PG/ML — SIGNIFICANT CHANGE UP (ref 232–1245)
WBC # BLD: 10.29 K/UL — SIGNIFICANT CHANGE UP (ref 4.8–10.8)
WBC # FLD AUTO: 10.29 K/UL — SIGNIFICANT CHANGE UP (ref 4.8–10.8)

## 2019-08-09 RX ORDER — SIMVASTATIN 20 MG/1
20 TABLET, FILM COATED ORAL AT BEDTIME
Refills: 0 | Status: DISCONTINUED | OUTPATIENT
Start: 2019-08-09 | End: 2019-08-19

## 2019-08-09 RX ORDER — ESCITALOPRAM OXALATE 10 MG/1
1 TABLET, FILM COATED ORAL
Qty: 0 | Refills: 0 | DISCHARGE

## 2019-08-09 RX ORDER — SIMVASTATIN 20 MG/1
1 TABLET, FILM COATED ORAL
Qty: 0 | Refills: 0 | DISCHARGE

## 2019-08-09 RX ORDER — CARBAMAZEPINE 200 MG
200 TABLET ORAL DAILY
Refills: 0 | Status: DISCONTINUED | OUTPATIENT
Start: 2019-08-09 | End: 2019-08-19

## 2019-08-09 RX ORDER — HYDROCHLOROTHIAZIDE 25 MG
12.5 TABLET ORAL DAILY
Refills: 0 | Status: DISCONTINUED | OUTPATIENT
Start: 2019-08-09 | End: 2019-08-19

## 2019-08-09 RX ORDER — CEFTRIAXONE 500 MG/1
1000 INJECTION, POWDER, FOR SOLUTION INTRAMUSCULAR; INTRAVENOUS EVERY 24 HOURS
Refills: 0 | Status: COMPLETED | OUTPATIENT
Start: 2019-08-09 | End: 2019-08-11

## 2019-08-09 RX ORDER — CARBAMAZEPINE 200 MG
1 TABLET ORAL
Qty: 0 | Refills: 0 | DISCHARGE

## 2019-08-09 RX ORDER — VALSARTAN 80 MG/1
320 TABLET ORAL DAILY
Refills: 0 | Status: DISCONTINUED | OUTPATIENT
Start: 2019-08-09 | End: 2019-08-19

## 2019-08-09 RX ORDER — QUETIAPINE FUMARATE 200 MG/1
0.5 TABLET, FILM COATED ORAL
Qty: 0 | Refills: 0 | DISCHARGE

## 2019-08-09 RX ORDER — ENOXAPARIN SODIUM 100 MG/ML
40 INJECTION SUBCUTANEOUS DAILY
Refills: 0 | Status: DISCONTINUED | OUTPATIENT
Start: 2019-08-09 | End: 2019-08-19

## 2019-08-09 RX ORDER — DONEPEZIL HYDROCHLORIDE 10 MG/1
1 TABLET, FILM COATED ORAL
Qty: 0 | Refills: 0 | DISCHARGE

## 2019-08-09 RX ORDER — AMLODIPINE BESYLATE 2.5 MG/1
5 TABLET ORAL DAILY
Refills: 0 | Status: DISCONTINUED | OUTPATIENT
Start: 2019-08-09 | End: 2019-08-19

## 2019-08-09 RX ORDER — QUETIAPINE FUMARATE 200 MG/1
12.5 TABLET, FILM COATED ORAL DAILY
Refills: 0 | Status: DISCONTINUED | OUTPATIENT
Start: 2019-08-09 | End: 2019-08-19

## 2019-08-09 RX ORDER — DONEPEZIL HYDROCHLORIDE 10 MG/1
10 TABLET, FILM COATED ORAL
Refills: 0 | Status: DISCONTINUED | OUTPATIENT
Start: 2019-08-09 | End: 2019-08-19

## 2019-08-09 RX ORDER — CHLORHEXIDINE GLUCONATE 213 G/1000ML
1 SOLUTION TOPICAL
Refills: 0 | Status: DISCONTINUED | OUTPATIENT
Start: 2019-08-09 | End: 2019-08-19

## 2019-08-09 RX ORDER — ESCITALOPRAM OXALATE 10 MG/1
20 TABLET, FILM COATED ORAL DAILY
Refills: 0 | Status: DISCONTINUED | OUTPATIENT
Start: 2019-08-09 | End: 2019-08-19

## 2019-08-09 RX ORDER — MEMANTINE HYDROCHLORIDE 10 MG/1
1 TABLET ORAL
Qty: 0 | Refills: 0 | DISCHARGE

## 2019-08-09 RX ORDER — MEMANTINE HYDROCHLORIDE 10 MG/1
10 TABLET ORAL
Refills: 0 | Status: DISCONTINUED | OUTPATIENT
Start: 2019-08-09 | End: 2019-08-19

## 2019-08-09 RX ADMIN — DONEPEZIL HYDROCHLORIDE 10 MILLIGRAM(S): 10 TABLET, FILM COATED ORAL at 05:18

## 2019-08-09 RX ADMIN — AMLODIPINE BESYLATE 5 MILLIGRAM(S): 2.5 TABLET ORAL at 18:26

## 2019-08-09 RX ADMIN — DONEPEZIL HYDROCHLORIDE 10 MILLIGRAM(S): 10 TABLET, FILM COATED ORAL at 18:26

## 2019-08-09 RX ADMIN — SIMVASTATIN 20 MILLIGRAM(S): 20 TABLET, FILM COATED ORAL at 21:53

## 2019-08-09 RX ADMIN — ENOXAPARIN SODIUM 40 MILLIGRAM(S): 100 INJECTION SUBCUTANEOUS at 11:22

## 2019-08-09 RX ADMIN — CHLORHEXIDINE GLUCONATE 1 APPLICATION(S): 213 SOLUTION TOPICAL at 05:17

## 2019-08-09 RX ADMIN — ESCITALOPRAM OXALATE 20 MILLIGRAM(S): 10 TABLET, FILM COATED ORAL at 11:22

## 2019-08-09 RX ADMIN — Medication 12.5 MILLIGRAM(S): at 05:18

## 2019-08-09 RX ADMIN — Medication 0.5 MILLIGRAM(S): at 22:52

## 2019-08-09 RX ADMIN — VALSARTAN 320 MILLIGRAM(S): 80 TABLET ORAL at 05:18

## 2019-08-09 RX ADMIN — QUETIAPINE FUMARATE 12.5 MILLIGRAM(S): 200 TABLET, FILM COATED ORAL at 11:22

## 2019-08-09 RX ADMIN — MEMANTINE HYDROCHLORIDE 10 MILLIGRAM(S): 10 TABLET ORAL at 18:26

## 2019-08-09 RX ADMIN — MEMANTINE HYDROCHLORIDE 10 MILLIGRAM(S): 10 TABLET ORAL at 05:18

## 2019-08-09 RX ADMIN — CEFTRIAXONE 100 MILLIGRAM(S): 500 INJECTION, POWDER, FOR SOLUTION INTRAMUSCULAR; INTRAVENOUS at 11:23

## 2019-08-09 NOTE — H&P ADULT - NSHPLABSRESULTS_GEN_ALL_CORE
14.1   6.53  )-----------( 235      ( 08 Aug 2019 19:48 )             41.0           142  |  102  |  17  ----------------------------<  111<H>  4.1   |  29  |  0.8    Ca    9.7      08 Aug 2019 19:48    TPro  7.1  /  Alb  4.9  /  TBili  0.3  /  DBili  x   /  AST  19  /  ALT  14  /  AlkPhos  92                Urinalysis Basic - ( 08 Aug 2019 20:18 )    Color: Yellow / Appearance: Clear / S.021 / pH: x  Gluc: x / Ketone: Negative  / Bili: Negative / Urobili: <2 mg/dL   Blood: x / Protein: Trace / Nitrite: Negative   Leuk Esterase: Moderate / RBC: 3 /HPF / WBC 9 /HPF   Sq Epi: x / Non Sq Epi: 3 /HPF / Bacteria: Negative            Lactate Trend            CAPILLARY BLOOD GLUCOSE    < from: CT Head No Cont (19 @ 22:35) >    Impression:    1.  No evidence of acute intra-cranial pathology.    2.  Stable appearance of left frontal lobe postsurgical changes. Stable   appearance of encephalomalacia within the left frontal lobe.    < end of copied text >

## 2019-08-09 NOTE — ED ADULT NURSE NOTE - CHIEF COMPLAINT QUOTE
Pt BIBA for AMS x 3days. Pt's family requesting to see neurologist. Pt a&ox1, disoriented to time and situation; HX of alzheimers

## 2019-08-09 NOTE — H&P ADULT - ASSESSMENT
73y F w/ PMH of HTN, HLD, and Alzheimer's dementia presents with worsening dementia.    #) Increased episodes of confusion/ agitation ( 2/2 worsening dementia)  - R/o reversible causes- check B12, TSH.   - UA +ve WBC, no bacteria s/p rocephin in ED  - No sepsis- doubt UTI as a cause of episodes of confusion. F/u urine cx, hold off on abx.   - Pt will likely need placement/ more hours from A  - c/w home meds donepezil, memantine, lexapro, quetiapine.   - follows with Dr Peters.     #) H/o Seizures  - known h/o meningioma resection in 2001 followed by seizures  - c/w carbamazepine    #) h/o HTN  - c/w home med valsartan/ HCTZ    #) h/o HLD  - c/w statin    #) diet- DASH  #) ambulate as tolerated  #) DVT ppx- lovenox  #) GI ppx- not indicated  #) Dispo- from home, might need placement. 73y F w/ PMH of HTN, HLD, and Alzheimer's dementia presents with worsening dementia.    #) Increased episodes of confusion/ agitation ( 2/2 worsening dementia)  - R/o reversible causes- check B12, TSH.   - CT head- no acute pathology.  - UA +ve WBC, no bacteria s/p rocephin in ED  - No sepsis- doubt UTI as a cause of episodes of confusion. F/u urine cx, hold off on abx.   - Pt will likely need placement/ more hours from A  - c/w home meds donepezil, memantine, lexapro, quetiapine.   - follows with Dr Peters.     #) H/o Seizures  - known h/o meningioma resection in 2001 followed by seizures  - c/w carbamazepine    #) h/o HTN  - c/w home med valsartan/ HCTZ    #) h/o HLD  - c/w statin    #) diet- DASH  #) ambulate as tolerated  #) DVT ppx- lovenox  #) GI ppx- not indicated  #) Dispo- from home, might need placement.

## 2019-08-09 NOTE — PATIENT PROFILE ADULT - PRO INTERPRETER NEED 2
Phase III Cardiopulmonary Rehab:  Pt attended self pay maintenance exercise session. Vitals and exercise logged per pt.    English

## 2019-08-09 NOTE — H&P ADULT - NSICDXPASTMEDICALHX_GEN_ALL_CORE_FT
PAST MEDICAL HISTORY:  Alzheimer's dementia without behavioral disturbance, unspecified timing of dementia onset     HTN (hypertension)     Hypercholesteremia     Seizure disorder

## 2019-08-09 NOTE — H&P ADULT - HISTORY OF PRESENT ILLNESS
Pt is a 73y F w/ PMH of HTN, HLD, and Alzheimer's dementia presents with increasing dementia.  HPI dates back to 3 days as per , when pt started having more pronounced episodes of confusion. As per family, pt is getting more nervous, at times doesnt recognise her , tries to leave the house. Pt has a HHA but family states that it is becoming difficult to care for  her at home. Follows with Dr Parks for dementia.   ROS positive for occasional episode of dizziness, negative otherwise. Denies SOB, chest pain, dysuria, GI/ complaints.     VS stable on admission, labs unremarkable except Pt is a 73y F w/ PMH of HTN, HLD, and Alzheimer's dementia presents with increasing dementia.  HPI dates back to 3 days as per , when pt started having more pronounced episodes of confusion. As per family, pt is getting more nervous, at times doesnt recognise her , tries to leave the house. Pt has a HHA but family states that it is becoming difficult to care for  her at home. Follows with Dr Parks for dementia.   ROS positive for occasional episode of dizziness, negative otherwise. Denies SOB, chest pain, dysuria, GI/ complaints.     VS stable on admission, labs unremarkable. CT head negative. Received rocephin in ED for UA showing + WBC and leukocyte esterase. Pt is a 73y F w/ PMH of HTN, HLD, and Alzheimer's dementia presents with worsening dementia.  HPI dates back to 3 days as per , when pt started having more pronounced episodes of confusion. As per family, pt is getting more nervous, at times doesnt recognise her , tries to leave the house. Pt has a HHA but family states that it is becoming difficult to care for  her at home. Follows with Dr Parks for dementia.   ROS positive for occasional episode of dizziness, negative otherwise. Denies SOB, chest pain, dysuria, GI/ complaints.     VS stable on admission, labs unremarkable. CT head negative. Received rocephin in ED for UA showing + WBC and leukocyte esterase.

## 2019-08-09 NOTE — CONSULT NOTE ADULT - ASSESSMENT
IMPRESSION: Rehab of gait dysfunction    PRECAUTIONS: [  ] Cardiac  [  ] Respiratory  [  ] Seizures [  ] Contact Isolation  [  ] Droplet Isolation  [  ] Other    Weight Bearing Status:     RECOMMENDATION:    Out of Bed to Chair     DVT/Decubiti Prophylaxis    REHAB PLAN:     [ x  ] Bedside P/T 3-5 times a week   [   ]   Bedside O/T  2-3 times a week             [   ] No Rehab Therapy Indicated                   [   ]  Speech Therapy   Conditioning/ROM                                    ADL  Bed Mobility                                               Conditioning/ROM  Transfers                                                     Bed Mobility  Sitting /Standing Balance                         Transfers                                        Gait Training                                               Sitting/Standing Balance  Stair Training [   ]Applicable                    Home equipment Eval                                                                        Splinting  [   ] Only      GOALS:   ADL   [   ]   Independent                    Transfers  [ x  ] Independent                          Ambulation  [x   ] Independent     [  x  ] With device                            [   ]  CG                                                         [   ]  CG                                                                  [   ] CG                            [    ] Min A                                                   [   ] Min A                                                              [   ] Min  A          DISCHARGE PLAN:   [   ]  Good candidate for Intensive Rehabilitation/Hospital based                                             Will tolerate 3hrs Intensive Rehab Daily                                       [ x   ]  Short Term Rehab in Skilled Nursing Facility / snf                                       [    ]  Home with Outpatient or VN services                                         [    ]  Possible Candidate for Intensive Hospital based Rehab

## 2019-08-09 NOTE — H&P ADULT - ATTENDING COMMENTS
73y F w/ PMH of HTN, HLD, and Alzheimer's dementia presents with worsening dementia.    Pt seen and examined; spoke with  at bedside    Chart reviewed- agree with history    already has had extensive outpt workup    needs neurology eval today    - needs safe DC plan; as has only lmited HHA at present, but  not able to care for her round the clock    fall precautions    DVT proph

## 2019-08-09 NOTE — H&P ADULT - NSHPSOCIALHISTORY_GEN_ALL_CORE
Pt lives with , has a HHA.   Ex smoker- smoked 2-3 cigarettes/ day for 25 years.   Drink 1 cocktail/ day.   no illicit drug use.

## 2019-08-09 NOTE — CONSULT NOTE ADULT - SUBJECTIVE AND OBJECTIVE BOX
HPI:  Pt is a 73y F w/ PMH of HTN, HLD, and Alzheimer's dementia presents with worsening dementia.  HPI dates back to 3 days as per , when pt started having more pronounced episodes of confusion. As per family, pt is getting more nervous, at times doesnt recognise her , tries to leave the house. Pt has a HHA but family states that it is becoming difficult to care for  her at home. Follows with Dr Parks for dementia.   ROS positive for occasional episode of dizziness, negative otherwise. Denies SOB, chest pain, dysuria, GI/ complaints.     VS stable on admission, labs unremarkable. CT head negative. Received rocephin in ED for UA showing + WBC and leukocyte esterase. (09 Aug 2019 01:26)      PAST MEDICAL & SURGICAL HISTORY:  Seizure disorder  Alzheimer's dementia without behavioral disturbance, unspecified timing of dementia onset  Hypercholesteremia  HTN (hypertension)  H/O: hysterectomy  History of craniotomy      Hospital Course:    TODAY'S SUBJECTIVE & REVIEW OF SYMPTOMS:     Constitutional WNL   Cardio WNL   Resp WNL   GI WNL  Heme WNL  Endo WNL  Skin WNL  MSK WNL  Neuro WNL  Cognitive confused  Psych WNL      MEDICATIONS  (STANDING):  carBAMazepine XR Tablet 200 milliGRAM(s) Oral daily  cefTRIAXone   IVPB 1000 milliGRAM(s) IV Intermittent every 24 hours  chlorhexidine 4% Liquid 1 Application(s) Topical <User Schedule>  donepezil 10 milliGRAM(s) Oral <User Schedule>  enoxaparin Injectable 40 milliGRAM(s) SubCutaneous daily  escitalopram 20 milliGRAM(s) Oral daily  hydrochlorothiazide 12.5 milliGRAM(s) Oral daily  memantine 10 milliGRAM(s) Oral two times a day  QUEtiapine 12.5 milliGRAM(s) Oral daily  simvastatin 20 milliGRAM(s) Oral at bedtime  valsartan 320 milliGRAM(s) Oral daily    MEDICATIONS  (PRN):      FAMILY HISTORY:  FH: diabetes mellitus: mother        Allergies    No Known Allergies    Intolerances        SOCIAL HISTORY:    [  ] Etoh  [  ] Smoking  [  ] Substance abuse     Home Environment:  [  ] Home Alone  [x  ] Lives with Family  [  ] Home Health Aid    Dwelling:  [  ] Apartment  [x  ] Private House  [  ] Adult Home  [  ] Skilled Nursing Facility      [  ] Short Term  [  ] Long Term  [ x ] Stairs       Elevator [  ]    FUNCTIONAL STATUS PTA: (Check all that apply)  Ambulation: [  x ]Independent    [  ] Dependent     [  ] Non-Ambulatory  Assistive Device: [  ] SA Cane  [  ]  Q Cane  [  ] Walker  [  ]  Wheelchair  ADL : [  ] Independent  [ x ]  Dependent       Vital Signs Last 24 Hrs  T(C): 36.2 (09 Aug 2019 05:55), Max: 36.2 (09 Aug 2019 05:55)  T(F): 97.1 (09 Aug 2019 05:55), Max: 97.1 (09 Aug 2019 05:55)  HR: 87 (09 Aug 2019 05:55) (68 - 87)  BP: 186/67 (09 Aug 2019 05:55) (145/70 - 196/77)  BP(mean): --  RR: 18 (09 Aug 2019 05:55) (18 - 18)  SpO2: 98% (09 Aug 2019 00:22) (98% - 98%)      PHYSICAL EXAM: Awake / confused  GENERAL: NAD, well-groomed, well-developed  HEAD:  Atraumatic, Normocephalic  CHEST/LUNG: Clear   HEART: S1S2+  ABDOMEN: Soft, Nontender  EXTREMITIES:  no calf tenderness    NERVOUS SYSTEM:  Cranial Nerves 2-12 intact [  ] Abnormal  [  ]  ROM: WFL all extremities [ x ]  Abnormal [  ]  Motor Strength: WFL all extremities  [ x ]  Abnormal [  ]  Sensation: intact to light touch [ x ] Abnormal [  ]  Reflexes: Symmetric [  ]  Abnormal [  ]    FUNCTIONAL STATUS:  Bed Mobility: Independent [  ]  Supervision [  ]  Needs Assistance [ x ]  N/A [  ]  Transfers: Independent [  ]  Supervision [  ]  Needs Assistance [ x ]  N/A [  ]   Ambulation: Independent [  ]  Supervision [  ]  Needs Assistance [  ]  N/A [  ]  ADL: Independent [  ] Requires Assistance [  ] N/A [  ]      LABS:                        14.6   10.29 )-----------( 215      ( 09 Aug 2019 08:03 )             41.9     -    142  |  102  |  13  ----------------------------<  137<H>  3.6   |  21  |  0.7    Ca    9.9      09 Aug 2019 08:03  Mg     2.2         TPro  7.3  /  Alb  4.6  /  TBili  0.5  /  DBili  x   /  AST  19  /  ALT  14  /  AlkPhos  85        Urinalysis Basic - ( 08 Aug 2019 20:18 )    Color: Yellow / Appearance: Clear / S.021 / pH: x  Gluc: x / Ketone: Negative  / Bili: Negative / Urobili: <2 mg/dL   Blood: x / Protein: Trace / Nitrite: Negative   Leuk Esterase: Moderate / RBC: 3 /HPF / WBC 9 /HPF   Sq Epi: x / Non Sq Epi: 3 /HPF / Bacteria: Negative        RADIOLOGY & ADDITIONAL STUDIES:    Assesment:

## 2019-08-09 NOTE — H&P ADULT - NSHPPHYSICALEXAM_GEN_ALL_CORE
PHYSICAL EXAM:  GENERAL: NAD, well-developed  HEAD:  Atraumatic, Normocephalic  EYES: EOMI, PERRLA, conjunctiva and sclera clear  NECK: Supple, No JVD  CHEST/LUNG: Clear to auscultation bilaterally; No wheeze  HEART: Regular rate and rhythm; No murmurs, rubs, or gallops  ABDOMEN: Soft, Nontender, Nondistended; Bowel sounds present  EXTREMITIES:  2+ Peripheral Pulses, No clubbing, cyanosis, or edema  PSYCH: AAOx2, answers questions appropriately. No focal deficits.   NEUROLOGY: non-focal  SKIN: No rashes or lesions

## 2019-08-09 NOTE — ED ADULT NURSE NOTE - OBJECTIVE STATEMENT
Patient is a/o x 3, patient brought in by family due to increased agitation and confusion. patient has hx Alzheimer's. patient is currently calm and alert. No distress noted at this time. Will continue to monitor.

## 2019-08-09 NOTE — PROGRESS NOTE ADULT - ASSESSMENT
73y F w/ PMH of HTN, HLD, and Alzheimer's dementia presents with worsening dementia. Today is her day 1 is in hospital. Patient seen and examined, she seems conversant and is pleasant but does not appear well oriented. The family was at bedside in the morning, they were concerned about her declining mental functions, they were not available later for detailed elaboration of ADLs. The workup has been ordered to rule out reversible causes of dementia.     #) Increased episodes of confusion/ agitation ( 2/2 worsening dementia)  - R/o reversible causes- check B12, TSH, pending results.  - CT head- no acute pathology.    #Urinary Tract Infection  - UA +ve WBC, no bacteria s/p rocephin in ED  - No sepsis  - doubt UTI as a cause of episodes of confusion.  - F/u urine cx, pending  - Pt will likely need placement/ more hours from A  - c/w home meds donepezil, memantine, lexapro, quetiapine.   - follows with Dr Peters.     #) H/o Seizures  - known h/o meningioma resection in 2001 followed by seizures  - c/w carbamazepine    #) h/o HTN  - was on home med valsartan/ HCTZ  -amlodipine 5mg started from home meds, if still high start on spironolactone 50 mg daily (also a home med, pt is not currently on it)    #) h/o HLD  - c/w statin    #) diet- DASH  #) ambulate as tolerated  #) DVT ppx- lovenox  #) GI ppx- not indicated  #) Dispo- from home, might need placement.

## 2019-08-10 LAB
AMMONIA BLD-MCNC: 22 UMOL/L — SIGNIFICANT CHANGE UP (ref 11–55)
ANION GAP SERPL CALC-SCNC: 14 MMOL/L — SIGNIFICANT CHANGE UP (ref 7–14)
BASOPHILS # BLD AUTO: 0.02 K/UL — SIGNIFICANT CHANGE UP (ref 0–0.2)
BASOPHILS NFR BLD AUTO: 0.3 % — SIGNIFICANT CHANGE UP (ref 0–1)
BUN SERPL-MCNC: 13 MG/DL — SIGNIFICANT CHANGE UP (ref 10–20)
CALCIUM SERPL-MCNC: 9.8 MG/DL — SIGNIFICANT CHANGE UP (ref 8.5–10.1)
CARBAMAZEPINE SERPL-MCNC: <2 UG/ML — LOW (ref 4–12)
CHLORIDE SERPL-SCNC: 101 MMOL/L — SIGNIFICANT CHANGE UP (ref 98–110)
CO2 SERPL-SCNC: 26 MMOL/L — SIGNIFICANT CHANGE UP (ref 17–32)
CREAT SERPL-MCNC: 0.8 MG/DL — SIGNIFICANT CHANGE UP (ref 0.7–1.5)
EOSINOPHIL # BLD AUTO: 0.06 K/UL — SIGNIFICANT CHANGE UP (ref 0–0.7)
EOSINOPHIL NFR BLD AUTO: 0.8 % — SIGNIFICANT CHANGE UP (ref 0–8)
GLUCOSE SERPL-MCNC: 88 MG/DL — SIGNIFICANT CHANGE UP (ref 70–99)
HCT VFR BLD CALC: 40.2 % — SIGNIFICANT CHANGE UP (ref 37–47)
HGB BLD-MCNC: 13.8 G/DL — SIGNIFICANT CHANGE UP (ref 12–16)
IMM GRANULOCYTES NFR BLD AUTO: 0.4 % — HIGH (ref 0.1–0.3)
LYMPHOCYTES # BLD AUTO: 1.27 K/UL — SIGNIFICANT CHANGE UP (ref 1.2–3.4)
LYMPHOCYTES # BLD AUTO: 16.9 % — LOW (ref 20.5–51.1)
MCHC RBC-ENTMCNC: 33.3 PG — HIGH (ref 27–31)
MCHC RBC-ENTMCNC: 34.3 G/DL — SIGNIFICANT CHANGE UP (ref 32–37)
MCV RBC AUTO: 96.9 FL — SIGNIFICANT CHANGE UP (ref 81–99)
MONOCYTES # BLD AUTO: 0.63 K/UL — HIGH (ref 0.1–0.6)
MONOCYTES NFR BLD AUTO: 8.4 % — SIGNIFICANT CHANGE UP (ref 1.7–9.3)
NEUTROPHILS # BLD AUTO: 5.49 K/UL — SIGNIFICANT CHANGE UP (ref 1.4–6.5)
NEUTROPHILS NFR BLD AUTO: 73.2 % — SIGNIFICANT CHANGE UP (ref 42.2–75.2)
NRBC # BLD: 0 /100 WBCS — SIGNIFICANT CHANGE UP (ref 0–0)
PLATELET # BLD AUTO: 168 K/UL — SIGNIFICANT CHANGE UP (ref 130–400)
POTASSIUM SERPL-MCNC: 3.9 MMOL/L — SIGNIFICANT CHANGE UP (ref 3.5–5)
POTASSIUM SERPL-SCNC: 3.9 MMOL/L — SIGNIFICANT CHANGE UP (ref 3.5–5)
RBC # BLD: 4.15 M/UL — LOW (ref 4.2–5.4)
RBC # FLD: 13.2 % — SIGNIFICANT CHANGE UP (ref 11.5–14.5)
SODIUM SERPL-SCNC: 141 MMOL/L — SIGNIFICANT CHANGE UP (ref 135–146)
WBC # BLD: 7.5 K/UL — SIGNIFICANT CHANGE UP (ref 4.8–10.8)
WBC # FLD AUTO: 7.5 K/UL — SIGNIFICANT CHANGE UP (ref 4.8–10.8)

## 2019-08-10 PROCEDURE — 99222 1ST HOSP IP/OBS MODERATE 55: CPT

## 2019-08-10 RX ADMIN — CHLORHEXIDINE GLUCONATE 1 APPLICATION(S): 213 SOLUTION TOPICAL at 05:46

## 2019-08-10 RX ADMIN — SIMVASTATIN 20 MILLIGRAM(S): 20 TABLET, FILM COATED ORAL at 21:41

## 2019-08-10 RX ADMIN — ESCITALOPRAM OXALATE 20 MILLIGRAM(S): 10 TABLET, FILM COATED ORAL at 11:59

## 2019-08-10 RX ADMIN — QUETIAPINE FUMARATE 12.5 MILLIGRAM(S): 200 TABLET, FILM COATED ORAL at 11:59

## 2019-08-10 RX ADMIN — Medication 12.5 MILLIGRAM(S): at 05:47

## 2019-08-10 RX ADMIN — DONEPEZIL HYDROCHLORIDE 10 MILLIGRAM(S): 10 TABLET, FILM COATED ORAL at 17:30

## 2019-08-10 RX ADMIN — Medication 200 MILLIGRAM(S): at 15:52

## 2019-08-10 RX ADMIN — DONEPEZIL HYDROCHLORIDE 10 MILLIGRAM(S): 10 TABLET, FILM COATED ORAL at 05:47

## 2019-08-10 RX ADMIN — MEMANTINE HYDROCHLORIDE 10 MILLIGRAM(S): 10 TABLET ORAL at 05:47

## 2019-08-10 RX ADMIN — AMLODIPINE BESYLATE 5 MILLIGRAM(S): 2.5 TABLET ORAL at 05:47

## 2019-08-10 RX ADMIN — MEMANTINE HYDROCHLORIDE 10 MILLIGRAM(S): 10 TABLET ORAL at 17:30

## 2019-08-10 RX ADMIN — CEFTRIAXONE 100 MILLIGRAM(S): 500 INJECTION, POWDER, FOR SOLUTION INTRAMUSCULAR; INTRAVENOUS at 15:50

## 2019-08-10 RX ADMIN — ENOXAPARIN SODIUM 40 MILLIGRAM(S): 100 INJECTION SUBCUTANEOUS at 11:58

## 2019-08-10 RX ADMIN — VALSARTAN 320 MILLIGRAM(S): 80 TABLET ORAL at 05:47

## 2019-08-10 NOTE — PROGRESS NOTE ADULT - ASSESSMENT
73y F w/ PMH of HTN, HLD, and Alzheimer's dementia presents with worsening dementia. Today is her day 2 is in hospital. Patient seen and examined, she seems  is pleasant but does not appear well oriented. The family was not at bedside. The workup has been ordered to rule out reversible causes of dementia and are pending results, neurology consulted.     #) Increased episodes of confusion/ agitation ( 2/2 worsening dementia)  - R/o reversible cause  - check B12, TSH, b12 normal, TSH slightly elevated  - CT head- no acute pathology.    #Urinary Tract Infection  - UA +ve WBC, no bacteria s/p rocephin in ED  - No sepsis  - doubt UTI as a cause of episodes of confusion.  - F/u urine cx, pending  - Pt will likely need placement/ more hours from A  - c/w home meds donepezil, memantine, lexapro, quetiapine.   - follows with Dr Peters.   -Neurology consult made    #) H/o Seizures  - known h/o meningioma resection in 2001 followed by seizures  - c/w carbamazepine    #) h/o HTN  - was on home med valsartan/ HCTZ  -amlodipine 5mg started from home meds, if still high start on spironolactone 50 mg daily (also a home med, pt is not currently on it)  -in lower ranges this morning, monitoring for response    #) h/o HLD  - c/w statin    #) diet- DASH  #) ambulate as tolerated  #) DVT ppx- lovenox  #) GI ppx- not indicated  #) Dispo- from home, might need placement.

## 2019-08-10 NOTE — CONSULT NOTE ADULT - SUBJECTIVE AND OBJECTIVE BOX
Neurology Consultation note    Name  DAVID SANDOVAL    HPI:  Pt is a 73y F w/ PMH of HTN, HLD, and Alzheimer's dementia presents with worsening dementia.  HPI dates back to 3 days as per , when pt started having more pronounced episodes of confusion. As per family, pt is getting more nervous, at times doesnt recognise her , tries to leave the house. Pt has a HHA but family states that it is becoming difficult to care for  her at home. Follows with Dr Parks for dementia.   ROS positive for occasional episode of dizziness, negative otherwise. Denies SOB, chest pain, dysuria, GI/ complaints.     VS stable on admission, labs unremarkable. CT head negative. Received rocephin in ED for UA showing + WBC and leukocyte esterase. (09 Aug 2019 01:26)    NEURO:  PATIENT IS A 74 YO FEMALE WITH HX AS ABOVE WITH ACUTE ON CHRONIC AMS  FOLLOWS WITH DR CHEEMA   PATIENT ON ARICEPT, NAMENDA AND TEGRETOL  PATIENT UNABLE TO GIVE HX      Vital Signs Last 24 Hrs  T(C): 36.3 (10 Aug 2019 05:42), Max: 36.4 (09 Aug 2019 13:30)  T(F): 97.4 (10 Aug 2019 05:42), Max: 97.6 (09 Aug 2019 21:20)  HR: 62 (10 Aug 2019 05:42) (62 - 97)  BP: 115/- (10 Aug 2019 05:42) (115/- - 186/88)  BP(mean): --  RR: 18 (10 Aug 2019 05:42) (18 - 19)  SpO2: --    Neurological Exam:   WAKE AND ALERT ORIENTED TO SELF ONLY. SPEECH FLUENT AND FOLLOWS ALL COMMANDS  CALM AND COOPERATIVE  PERRL EOM FULL FACE SYMMETRIC  MOTOR 5/5 X 4  +PALMOMENTAL  UPPER LIP TREMOR  +GEGENHALTEN     Medications  amLODIPine   Tablet 5 milliGRAM(s) Oral daily  carBAMazepine XR Tablet 200 milliGRAM(s) Oral daily  cefTRIAXone   IVPB 1000 milliGRAM(s) IV Intermittent every 24 hours  chlorhexidine 4% Liquid 1 Application(s) Topical <User Schedule>  donepezil 10 milliGRAM(s) Oral <User Schedule>  enoxaparin Injectable 40 milliGRAM(s) SubCutaneous daily  escitalopram 20 milliGRAM(s) Oral daily  hydrochlorothiazide 12.5 milliGRAM(s) Oral daily  memantine 10 milliGRAM(s) Oral two times a day  QUEtiapine 12.5 milliGRAM(s) Oral daily  simvastatin 20 milliGRAM(s) Oral at bedtime  valsartan 320 milliGRAM(s) Oral daily      Lab  08-10    141  |  101  |  13  ----------------------------<  88  3.9   |  26  |  0.8    Ca    9.8      10 Aug 2019 07:06  Mg     2.2     08-09    TPro  7.3  /  Alb  4.6  /  TBili  0.5  /  DBili  x   /  AST  19  /  ALT  14  /  AlkPhos  85  08-09                          13.8   7.50  )-----------( 168      ( 10 Aug 2019 07:06 )             40.2     LIVER FUNCTIONS - ( 09 Aug 2019 08:03 )  Alb: 4.6 g/dL / Pro: 7.3 g/dL / ALK PHOS: 85 U/L / ALT: 14 U/L / AST: 19 U/L / GGT: x                   Radiology  CTH L FRONTAL LOBE ENCEPHALOMALACIA   NO ACUTE CHANGE        Assessment: 74 YO FEMALE WITH KNOWN AD, sz disorder 2/2 REMOTE MENINGIOMA RESECTION FOLLOWED BY DR CHEEMA P/W MORE ACUTE AMS    Plan:  REEG  CHECK TEGRETOL LEVEL  CONT ARICEPT AND NAMENDA   CONT TEGRETOL

## 2019-08-10 NOTE — PROGRESS NOTE ADULT - ASSESSMENT
dementia with agitatin  uti  seizures  htn      continue workup  hha  social service  neuro fu continue current rx

## 2019-08-11 DIAGNOSIS — G30.9 ALZHEIMER'S DISEASE, UNSPECIFIED: ICD-10-CM

## 2019-08-11 DIAGNOSIS — I10 ESSENTIAL (PRIMARY) HYPERTENSION: ICD-10-CM

## 2019-08-11 RX ORDER — ACETAMINOPHEN 500 MG
650 TABLET ORAL ONCE
Refills: 0 | Status: COMPLETED | OUTPATIENT
Start: 2019-08-11 | End: 2019-08-11

## 2019-08-11 RX ORDER — CARBAMAZEPINE 200 MG
200 TABLET ORAL ONCE
Refills: 0 | Status: COMPLETED | OUTPATIENT
Start: 2019-08-11 | End: 2019-08-11

## 2019-08-11 RX ADMIN — MEMANTINE HYDROCHLORIDE 10 MILLIGRAM(S): 10 TABLET ORAL at 17:31

## 2019-08-11 RX ADMIN — QUETIAPINE FUMARATE 12.5 MILLIGRAM(S): 200 TABLET, FILM COATED ORAL at 12:07

## 2019-08-11 RX ADMIN — Medication 200 MILLIGRAM(S): at 12:21

## 2019-08-11 RX ADMIN — SIMVASTATIN 20 MILLIGRAM(S): 20 TABLET, FILM COATED ORAL at 21:55

## 2019-08-11 RX ADMIN — DONEPEZIL HYDROCHLORIDE 10 MILLIGRAM(S): 10 TABLET, FILM COATED ORAL at 05:53

## 2019-08-11 RX ADMIN — DONEPEZIL HYDROCHLORIDE 10 MILLIGRAM(S): 10 TABLET, FILM COATED ORAL at 17:31

## 2019-08-11 RX ADMIN — ENOXAPARIN SODIUM 40 MILLIGRAM(S): 100 INJECTION SUBCUTANEOUS at 12:06

## 2019-08-11 RX ADMIN — Medication 200 MILLIGRAM(S): at 17:31

## 2019-08-11 RX ADMIN — CHLORHEXIDINE GLUCONATE 1 APPLICATION(S): 213 SOLUTION TOPICAL at 06:11

## 2019-08-11 RX ADMIN — AMLODIPINE BESYLATE 5 MILLIGRAM(S): 2.5 TABLET ORAL at 06:11

## 2019-08-11 RX ADMIN — Medication 12.5 MILLIGRAM(S): at 05:52

## 2019-08-11 RX ADMIN — MEMANTINE HYDROCHLORIDE 10 MILLIGRAM(S): 10 TABLET ORAL at 05:52

## 2019-08-11 RX ADMIN — VALSARTAN 320 MILLIGRAM(S): 80 TABLET ORAL at 06:11

## 2019-08-11 RX ADMIN — CEFTRIAXONE 100 MILLIGRAM(S): 500 INJECTION, POWDER, FOR SOLUTION INTRAMUSCULAR; INTRAVENOUS at 14:42

## 2019-08-11 RX ADMIN — Medication 650 MILLIGRAM(S): at 18:07

## 2019-08-11 RX ADMIN — ESCITALOPRAM OXALATE 20 MILLIGRAM(S): 10 TABLET, FILM COATED ORAL at 12:07

## 2019-08-11 NOTE — CHART NOTE - NSCHARTNOTEFT_GEN_A_CORE
Patient is subtherapeutic for carbamazepine - will give one more 200mg dose today; ordered carbamazepine level for tomorrow. Please f/u with neurology in regards to subtherapeutic levels and any change in management.

## 2019-08-12 LAB
ANION GAP SERPL CALC-SCNC: 11 MMOL/L — SIGNIFICANT CHANGE UP (ref 7–14)
BASOPHILS # BLD AUTO: 0.03 K/UL — SIGNIFICANT CHANGE UP (ref 0–0.2)
BASOPHILS NFR BLD AUTO: 0.6 % — SIGNIFICANT CHANGE UP (ref 0–1)
BUN SERPL-MCNC: 16 MG/DL — SIGNIFICANT CHANGE UP (ref 10–20)
CALCIUM SERPL-MCNC: 9.5 MG/DL — SIGNIFICANT CHANGE UP (ref 8.5–10.1)
CARBAMAZEPINE SERPL-MCNC: 6.3 UG/ML — SIGNIFICANT CHANGE UP (ref 4–12)
CHLORIDE SERPL-SCNC: 99 MMOL/L — SIGNIFICANT CHANGE UP (ref 98–110)
CO2 SERPL-SCNC: 31 MMOL/L — SIGNIFICANT CHANGE UP (ref 17–32)
CREAT SERPL-MCNC: 0.8 MG/DL — SIGNIFICANT CHANGE UP (ref 0.7–1.5)
CULTURE RESULTS: SIGNIFICANT CHANGE UP
EOSINOPHIL # BLD AUTO: 0.06 K/UL — SIGNIFICANT CHANGE UP (ref 0–0.7)
EOSINOPHIL NFR BLD AUTO: 1.3 % — SIGNIFICANT CHANGE UP (ref 0–8)
GLUCOSE SERPL-MCNC: 88 MG/DL — SIGNIFICANT CHANGE UP (ref 70–99)
HCT VFR BLD CALC: 38.7 % — SIGNIFICANT CHANGE UP (ref 37–47)
HGB BLD-MCNC: 13.2 G/DL — SIGNIFICANT CHANGE UP (ref 12–16)
IMM GRANULOCYTES NFR BLD AUTO: 0.2 % — SIGNIFICANT CHANGE UP (ref 0.1–0.3)
LYMPHOCYTES # BLD AUTO: 1.34 K/UL — SIGNIFICANT CHANGE UP (ref 1.2–3.4)
LYMPHOCYTES # BLD AUTO: 28.9 % — SIGNIFICANT CHANGE UP (ref 20.5–51.1)
MCHC RBC-ENTMCNC: 32.7 PG — HIGH (ref 27–31)
MCHC RBC-ENTMCNC: 34.1 G/DL — SIGNIFICANT CHANGE UP (ref 32–37)
MCV RBC AUTO: 95.8 FL — SIGNIFICANT CHANGE UP (ref 81–99)
MONOCYTES # BLD AUTO: 0.41 K/UL — SIGNIFICANT CHANGE UP (ref 0.1–0.6)
MONOCYTES NFR BLD AUTO: 8.8 % — SIGNIFICANT CHANGE UP (ref 1.7–9.3)
NEUTROPHILS # BLD AUTO: 2.79 K/UL — SIGNIFICANT CHANGE UP (ref 1.4–6.5)
NEUTROPHILS NFR BLD AUTO: 60.2 % — SIGNIFICANT CHANGE UP (ref 42.2–75.2)
NRBC # BLD: 0 /100 WBCS — SIGNIFICANT CHANGE UP (ref 0–0)
PLATELET # BLD AUTO: 189 K/UL — SIGNIFICANT CHANGE UP (ref 130–400)
POTASSIUM SERPL-MCNC: 3.8 MMOL/L — SIGNIFICANT CHANGE UP (ref 3.5–5)
POTASSIUM SERPL-SCNC: 3.8 MMOL/L — SIGNIFICANT CHANGE UP (ref 3.5–5)
RBC # BLD: 4.04 M/UL — LOW (ref 4.2–5.4)
RBC # FLD: 12.9 % — SIGNIFICANT CHANGE UP (ref 11.5–14.5)
SODIUM SERPL-SCNC: 141 MMOL/L — SIGNIFICANT CHANGE UP (ref 135–146)
SPECIMEN SOURCE: SIGNIFICANT CHANGE UP
WBC # BLD: 4.64 K/UL — LOW (ref 4.8–10.8)
WBC # FLD AUTO: 4.64 K/UL — LOW (ref 4.8–10.8)

## 2019-08-12 RX ADMIN — CHLORHEXIDINE GLUCONATE 1 APPLICATION(S): 213 SOLUTION TOPICAL at 06:04

## 2019-08-12 RX ADMIN — Medication 200 MILLIGRAM(S): at 11:10

## 2019-08-12 RX ADMIN — MEMANTINE HYDROCHLORIDE 10 MILLIGRAM(S): 10 TABLET ORAL at 06:03

## 2019-08-12 RX ADMIN — VALSARTAN 320 MILLIGRAM(S): 80 TABLET ORAL at 06:03

## 2019-08-12 RX ADMIN — Medication 12.5 MILLIGRAM(S): at 06:03

## 2019-08-12 RX ADMIN — ENOXAPARIN SODIUM 40 MILLIGRAM(S): 100 INJECTION SUBCUTANEOUS at 11:06

## 2019-08-12 RX ADMIN — DONEPEZIL HYDROCHLORIDE 10 MILLIGRAM(S): 10 TABLET, FILM COATED ORAL at 17:31

## 2019-08-12 RX ADMIN — DONEPEZIL HYDROCHLORIDE 10 MILLIGRAM(S): 10 TABLET, FILM COATED ORAL at 06:03

## 2019-08-12 RX ADMIN — QUETIAPINE FUMARATE 12.5 MILLIGRAM(S): 200 TABLET, FILM COATED ORAL at 11:10

## 2019-08-12 RX ADMIN — ESCITALOPRAM OXALATE 20 MILLIGRAM(S): 10 TABLET, FILM COATED ORAL at 11:07

## 2019-08-12 RX ADMIN — AMLODIPINE BESYLATE 5 MILLIGRAM(S): 2.5 TABLET ORAL at 06:03

## 2019-08-12 RX ADMIN — SIMVASTATIN 20 MILLIGRAM(S): 20 TABLET, FILM COATED ORAL at 21:11

## 2019-08-12 RX ADMIN — MEMANTINE HYDROCHLORIDE 10 MILLIGRAM(S): 10 TABLET ORAL at 17:31

## 2019-08-12 NOTE — PROGRESS NOTE ADULT - ASSESSMENT
73y F w/ PMH of HTN, HLD, and Alzheimer's dementia presents with worsening dementia. Today is her day 4 is in hospital. Patient seen and examined, she seems  is pleasant but does not appear well oriented. The workup has been ordered to rule out reversible causes of dementia and are pending results, neurology consulted.       #Placement issues  Social team working on this    #) Increased episodes of confusion/ agitation ( 2/2 worsening dementia)  - R/o reversible cause  - check B12, TSH, b12 normal, TSH slightly elevated  - CT head- no acute pathology.    #Urinary Tract Infection  - UA +ve WBC, no bacteria s/p rocephin in ED  - No sepsis  - F/u urine cx, -ve  - Pt will likely need placement/ more hours from [facility]  - c/w home meds donepezil, memantine, lexapro, quetiapine.   - follows with Dr Peters.   -Neurology consult made    #) H/o Seizures  - known h/o meningioma resection in 2001 followed by seizures  - c/w carbamazepine  -subtherapeutic levels detected, normal now    #) h/o HTN  - was on home med valsartan/ HCTZ  -amlodipine 5mg started from home meds, if still high start on spironolactone 50 mg daily (also a home med, pt is not currently on it)  -in lower ranges this morning, monitoring for response    #) h/o HLD  - c/w statin    #) diet- DASH  #) ambulate as tolerated  #) DVT ppx- lovenox  #) GI ppx- not indicated  #) Dispo- from home, might need placement.

## 2019-08-13 ENCOUNTER — APPOINTMENT (OUTPATIENT)
Dept: NEUROLOGY | Facility: CLINIC | Age: 74
End: 2019-08-13

## 2019-08-13 RX ADMIN — AMLODIPINE BESYLATE 5 MILLIGRAM(S): 2.5 TABLET ORAL at 06:48

## 2019-08-13 RX ADMIN — MEMANTINE HYDROCHLORIDE 10 MILLIGRAM(S): 10 TABLET ORAL at 06:47

## 2019-08-13 RX ADMIN — DONEPEZIL HYDROCHLORIDE 10 MILLIGRAM(S): 10 TABLET, FILM COATED ORAL at 18:28

## 2019-08-13 RX ADMIN — DONEPEZIL HYDROCHLORIDE 10 MILLIGRAM(S): 10 TABLET, FILM COATED ORAL at 06:48

## 2019-08-13 RX ADMIN — CHLORHEXIDINE GLUCONATE 1 APPLICATION(S): 213 SOLUTION TOPICAL at 06:48

## 2019-08-13 RX ADMIN — Medication 200 MILLIGRAM(S): at 11:51

## 2019-08-13 RX ADMIN — Medication 12.5 MILLIGRAM(S): at 06:48

## 2019-08-13 RX ADMIN — ESCITALOPRAM OXALATE 20 MILLIGRAM(S): 10 TABLET, FILM COATED ORAL at 11:50

## 2019-08-13 RX ADMIN — SIMVASTATIN 20 MILLIGRAM(S): 20 TABLET, FILM COATED ORAL at 22:15

## 2019-08-13 RX ADMIN — MEMANTINE HYDROCHLORIDE 10 MILLIGRAM(S): 10 TABLET ORAL at 18:28

## 2019-08-13 RX ADMIN — VALSARTAN 320 MILLIGRAM(S): 80 TABLET ORAL at 06:48

## 2019-08-13 RX ADMIN — QUETIAPINE FUMARATE 12.5 MILLIGRAM(S): 200 TABLET, FILM COATED ORAL at 11:51

## 2019-08-13 RX ADMIN — ENOXAPARIN SODIUM 40 MILLIGRAM(S): 100 INJECTION SUBCUTANEOUS at 11:50

## 2019-08-13 NOTE — DIETITIAN INITIAL EVALUATION ADULT. - ENERGY NEEDS
Calories: 3198-1080 kcal/day (MSJ x 1.2-1.3 AF)  Protein: 53-64 gm/day (1-1.2 gm/kg CBW)  Fluid: 1 ml/kcal

## 2019-08-13 NOTE — PROGRESS NOTE ADULT - ASSESSMENT
73y F w/ PMH of HTN, HLD, and Alzheimer's dementia presents with worsening dementia. Today is her day 4 is in hospital. Patient seen and examined, she seems  is pleasant but does not appear well oriented. The workup has been ordered to rule out reversible causes of dementia and are pending results, neurology consulted. 1:1 observation discontinued.      #Placement issues  Social team working on this    #) Increased episodes of confusion/ agitation ( 2/2 worsening dementia)  - R/o reversible cause  - check B12, TSH, b12 normal, TSH slightly elevated  - CT head- no acute pathology.    #Urinary Tract Infection  - UA +ve WBC, no bacteria s/p rocephin in ED  - No sepsis  - F/u urine cx, -ve  - Pt will likely need placement/ more hours from [facility]  - c/w home meds donepezil, memantine, lexapro, quetiapine.   - follows with Dr Peters.   -Neurology consult made    #) H/o Seizures  - known h/o meningioma resection in 2001 followed by seizures  - c/w carbamazepine  -subtherapeutic levels detected, normal now    #) h/o HTN  - was on home med valsartan/ HCTZ  -amlodipine 5mg started from home meds, if still high start on spironolactone 50 mg daily (also a home med, pt is not currently on it)  -in lower ranges this morning, monitoring for response    #) h/o HLD  - c/w statin    #) diet- DASH  #) ambulate as tolerated  #) DVT ppx- lovenox  #) GI ppx- not indicated  #) Dispo- from home, might need placement. 73y F w/ PMH of HTN, HLD, and Alzheimer's dementia presents with worsening dementia. Today is her day 5 is in hospital. Patient seen and examined, she seems  is pleasant but does not appear well oriented. The workup has been ordered to rule out reversible causes of dementia and are pending results, neurology consulted. 1:1 observation discontinued.      #Placement issues  Social team working on this    #) Increased episodes of confusion/ agitation ( 2/2 worsening dementia)  - R/o reversible cause  - check B12, TSH, b12 normal, TSH slightly elevated  - CT head- no acute pathology.    #Urinary Tract Infection  - UA +ve WBC, no bacteria s/p rocephin in ED  - No sepsis  - F/u urine cx, -ve  - Pt will likely need placement/ more hours from [facility]  - c/w home meds donepezil, memantine, lexapro, quetiapine.   - follows with Dr Peters.   -Neurology consult made    #) H/o Seizures  - known h/o meningioma resection in 2001 followed by seizures  - c/w carbamazepine  -subtherapeutic levels detected, normal now    #) h/o HTN  - was on home med valsartan/ HCTZ  -amlodipine 5mg started from home meds, if still high start on spironolactone 50 mg daily (also a home med, pt is not currently on it)  -in lower ranges this morning, monitoring for response    #) h/o HLD  - c/w statin    #) diet- DASH  #) ambulate as tolerated  #) DVT ppx- lovenox  #) GI ppx- not indicated  #) Dispo- from home, might need placement.

## 2019-08-13 NOTE — DIETITIAN INITIAL EVALUATION ADULT. - PHYSICAL APPEARANCE
well nourished/confused, disoriented. BMI: 21.2, IBW: 110#, no edema, BS 19; unable to conduct nutrition focused physical exam at this time

## 2019-08-13 NOTE — DIETITIAN INITIAL EVALUATION ADULT. - FEEDING SKILL
Spoke with pt's PCA as pt confused, disoriented; he reports that pt ate <25% for both breakfast and lunch today./independent

## 2019-08-13 NOTE — DIETITIAN INITIAL EVALUATION ADULT. - OTHER INFO
Nutrition Hx PTA: Unable to obtain at this time; per PCA pt's  was here earlier in the day; called pt's , however no answer.     Pt p/w worsening dementia--neurology f/u today. DC planning when cleared by neurology- will need 24 hr care vs SNF ([facility] dementia unit).

## 2019-08-14 LAB — CARBAMAZEPINE, FREE: SIGNIFICANT CHANGE UP UG/ML (ref 0.6–4.2)

## 2019-08-14 RX ADMIN — Medication 12.5 MILLIGRAM(S): at 05:51

## 2019-08-14 RX ADMIN — MEMANTINE HYDROCHLORIDE 10 MILLIGRAM(S): 10 TABLET ORAL at 05:51

## 2019-08-14 RX ADMIN — VALSARTAN 320 MILLIGRAM(S): 80 TABLET ORAL at 05:51

## 2019-08-14 RX ADMIN — ESCITALOPRAM OXALATE 20 MILLIGRAM(S): 10 TABLET, FILM COATED ORAL at 12:52

## 2019-08-14 RX ADMIN — ENOXAPARIN SODIUM 40 MILLIGRAM(S): 100 INJECTION SUBCUTANEOUS at 12:52

## 2019-08-14 RX ADMIN — AMLODIPINE BESYLATE 5 MILLIGRAM(S): 2.5 TABLET ORAL at 05:51

## 2019-08-14 RX ADMIN — MEMANTINE HYDROCHLORIDE 10 MILLIGRAM(S): 10 TABLET ORAL at 19:11

## 2019-08-14 RX ADMIN — DONEPEZIL HYDROCHLORIDE 10 MILLIGRAM(S): 10 TABLET, FILM COATED ORAL at 05:51

## 2019-08-14 RX ADMIN — QUETIAPINE FUMARATE 12.5 MILLIGRAM(S): 200 TABLET, FILM COATED ORAL at 12:48

## 2019-08-14 RX ADMIN — DONEPEZIL HYDROCHLORIDE 10 MILLIGRAM(S): 10 TABLET, FILM COATED ORAL at 19:11

## 2019-08-14 RX ADMIN — Medication 200 MILLIGRAM(S): at 12:53

## 2019-08-14 RX ADMIN — CHLORHEXIDINE GLUCONATE 1 APPLICATION(S): 213 SOLUTION TOPICAL at 05:51

## 2019-08-14 RX ADMIN — SIMVASTATIN 20 MILLIGRAM(S): 20 TABLET, FILM COATED ORAL at 21:10

## 2019-08-14 NOTE — PROGRESS NOTE ADULT - ASSESSMENT
73y F w/ PMH of HTN, HLD, and Alzheimer's dementia presents with worsening dementia. Today is her day 6 is in hospital. Patient seen and examined, she appear ill oriented. The workup has been ordered to rule out reversible causes of dementia and are pending results, neurology consulted. 1:1 observation discontinued. Workup being done for possible nursing home placement pending insurance authorization.       #Placement issues  Social team working on this    #) Increased episodes of confusion/ agitation ( 2/2 worsening dementia)  - R/o reversible cause  - b12 normal, TSH slightly elevated  - CT head- no acute pathology.    #Urinary Tract Infection  - UA +ve WBC, no bacteria s/p rocephin in ED  - No sepsis  - F/u urine cx, -ve  - Pt will likely need placement/ more hours from [facility]  - c/w home meds donepezil, memantine, lexapro, quetiapine.   - follows with Dr Peters.   -Neurology consult made    #) H/o Seizures  - known h/o meningioma resection in 2001 followed by seizures  - c/w carbamazepine  -subtherapeutic levels detected, normal now    #) h/o HTN  - was on home med valsartan/ HCTZ  -amlodipine 5mg started from home meds, if still high start on spironolactone 50 mg daily (also a home med, pt is not currently on it)  -in lower ranges this morning, monitoring for response    #) h/o HLD  - c/w statin    #) diet- DASH  #) ambulate as tolerated  #) DVT ppx- lovenox  #) GI ppx- not indicated  #) Dispo- from home, might need placement.

## 2019-08-15 RX ADMIN — ESCITALOPRAM OXALATE 20 MILLIGRAM(S): 10 TABLET, FILM COATED ORAL at 12:43

## 2019-08-15 RX ADMIN — CHLORHEXIDINE GLUCONATE 1 APPLICATION(S): 213 SOLUTION TOPICAL at 05:47

## 2019-08-15 RX ADMIN — DONEPEZIL HYDROCHLORIDE 10 MILLIGRAM(S): 10 TABLET, FILM COATED ORAL at 05:47

## 2019-08-15 RX ADMIN — QUETIAPINE FUMARATE 12.5 MILLIGRAM(S): 200 TABLET, FILM COATED ORAL at 12:44

## 2019-08-15 RX ADMIN — MEMANTINE HYDROCHLORIDE 10 MILLIGRAM(S): 10 TABLET ORAL at 05:47

## 2019-08-15 RX ADMIN — AMLODIPINE BESYLATE 5 MILLIGRAM(S): 2.5 TABLET ORAL at 05:47

## 2019-08-15 RX ADMIN — SIMVASTATIN 20 MILLIGRAM(S): 20 TABLET, FILM COATED ORAL at 21:30

## 2019-08-15 RX ADMIN — ENOXAPARIN SODIUM 40 MILLIGRAM(S): 100 INJECTION SUBCUTANEOUS at 12:44

## 2019-08-15 RX ADMIN — MEMANTINE HYDROCHLORIDE 10 MILLIGRAM(S): 10 TABLET ORAL at 18:13

## 2019-08-15 RX ADMIN — DONEPEZIL HYDROCHLORIDE 10 MILLIGRAM(S): 10 TABLET, FILM COATED ORAL at 18:12

## 2019-08-15 RX ADMIN — Medication 200 MILLIGRAM(S): at 12:44

## 2019-08-15 RX ADMIN — VALSARTAN 320 MILLIGRAM(S): 80 TABLET ORAL at 05:47

## 2019-08-15 RX ADMIN — Medication 12.5 MILLIGRAM(S): at 05:47

## 2019-08-15 NOTE — PROGRESS NOTE ADULT - ASSESSMENT
73y F w/ PMH of HTN, HLD, and Alzheimer's dementia presents with worsening dementia. Today is her day 6 is in hospital. Patient seen and examined, she appear ill oriented. The workup has been ordered to rule out reversible causes of dementia and so far have been negative, neurology consulted. 1:1 observation discontinued. Workup being done for possible nursing home placement pending insurance authorization.       #Placement issues  Social team working on this-NO UPDATE YET.    #) Increased episodes of confusion/ agitation ( 2/2 worsening dementia)  - R/o reversible cause  - b12 normal, TSH slightly elevated  - CT head no acute pathology.    #Urinary Tract Infection  - UA +ve WBC, no bacteria s/p rocephin in ED  - No sepsis  - F/u urine cx, -ve  - Pt will likely need placement/ more hours from [facility]  - c/w home meds donepezil, memantine, lexapro, quetiapine.   - follows with Dr Peters.   -Neurology consult made    #) H/o Seizures  - known h/o meningioma resection in 2001 followed by seizures  - c/w carbamazepine  -subtherapeutic levels detected, normal now    #) h/o HTN  - was on home med valsartan/ HCTZ  -amlodipine 5mg started from home meds, if still high start on spironolactone 50 mg daily (also a home med, pt is not currently on it)  -in lower ranges this morning, monitoring for response    #) h/o HLD  - c/w statin    #) diet- DASH  #) ambulate as tolerated  #) DVT ppx- lovenox  #) GI ppx- not indicated  #) Dispo- from home, might need placement.

## 2019-08-16 RX ADMIN — Medication 200 MILLIGRAM(S): at 11:20

## 2019-08-16 RX ADMIN — AMLODIPINE BESYLATE 5 MILLIGRAM(S): 2.5 TABLET ORAL at 06:37

## 2019-08-16 RX ADMIN — DONEPEZIL HYDROCHLORIDE 10 MILLIGRAM(S): 10 TABLET, FILM COATED ORAL at 21:14

## 2019-08-16 RX ADMIN — SIMVASTATIN 20 MILLIGRAM(S): 20 TABLET, FILM COATED ORAL at 21:14

## 2019-08-16 RX ADMIN — VALSARTAN 320 MILLIGRAM(S): 80 TABLET ORAL at 06:37

## 2019-08-16 RX ADMIN — MEMANTINE HYDROCHLORIDE 10 MILLIGRAM(S): 10 TABLET ORAL at 06:37

## 2019-08-16 RX ADMIN — MEMANTINE HYDROCHLORIDE 10 MILLIGRAM(S): 10 TABLET ORAL at 21:13

## 2019-08-16 RX ADMIN — DONEPEZIL HYDROCHLORIDE 10 MILLIGRAM(S): 10 TABLET, FILM COATED ORAL at 06:37

## 2019-08-16 RX ADMIN — CHLORHEXIDINE GLUCONATE 1 APPLICATION(S): 213 SOLUTION TOPICAL at 06:37

## 2019-08-16 RX ADMIN — QUETIAPINE FUMARATE 12.5 MILLIGRAM(S): 200 TABLET, FILM COATED ORAL at 11:20

## 2019-08-16 RX ADMIN — ENOXAPARIN SODIUM 40 MILLIGRAM(S): 100 INJECTION SUBCUTANEOUS at 11:19

## 2019-08-16 RX ADMIN — Medication 12.5 MILLIGRAM(S): at 06:37

## 2019-08-16 RX ADMIN — ESCITALOPRAM OXALATE 20 MILLIGRAM(S): 10 TABLET, FILM COATED ORAL at 11:20

## 2019-08-16 NOTE — CHART NOTE - NSCHARTNOTEFT_GEN_A_CORE
Registered Dietitian Follow-Up     Patient Profile Reviewed                           Yes [x]   No []     Nutrition History Previously Obtained        Yes []  No [x] obtained on this assessment      Pertinent Subjective Information: Pt is a poor historian (alzheimer's). Per PCA, pt does not eat well. Per PCA, pt last BM was today (8/16). Spoke with pt  over the phone to obtain a nutrition hx. Pt  reports that she follows a heart healthy diet at home (limiting fats, sodium and cholesterol). Pt  reports that she does not usually have a good appetite and that she does eat very well. Pt  reports that she eats better when encouraged to do so. Pt  states that pt takes vitamin C and vitamin D3 supplements pta. Pt  reports that pt had a hysterectomy in November of last year (9 months ago), and prior to that she weighed 134 lbs. Since then, pt wt has steadily declined. Pt  reports that since then, he has tried to give her 1 ensure per day d/t poor intake. Pt wt from 8/11 is 116 lbs. This is an 18 lbs or 13% wt loss over a time frame of 9 months. Unable to perform NFPE at this time, however pt meets malnutrition criteria for wt loss and poor po intake.      Pertinent Medical Interventions: Pt admitted d/t worsening dementia/confusion. During this admission, pt has had work up for reversible causes of dementia that have come back (-). Pt is awaiting placement pending insurance.      Diet order: DASH/TLC     Anthropometrics:  - Ht. 62"  - Wt. 116 lbs/52.6 kg (8/11, no new wt)  - %wt change NA  - BMI 21.2  - IBW: 110 lbs/50 kg     Pertinent Lab Data: (8/12) RBC 4.04     Pertinent Meds: lovenox, norvasc, hydrochlorothiazide, zocor, diovan    Physical findings  - Appearance: skinny; however normal BMI  - GI function: no diarrhea/constipation  - Tubes: N/A  - Oral/Mouth cavity: no chewing/swallowing difficulty  - Skin: (8/16) intact     Nutrition Requirements  Weight Used: Admission: 116 lbs/52.6 kg     Estimated Energy Needs    Continue [x] 3912-5503 kcal/day (MSJ x 1.2-1.3 AF)  Adjust []  Adjusted Energy Recommendations:   kcal/day        Estimated Protein Needs    Continue [x]  53-64 gm/day (1-1.2 gm/kg CBW) Adjust []  Adjusted Protein Recommendations:   gm/day        Estimated Fluid Needs        Continue [x] 1mL/kcal or per LIP  Adjust []  Adjusted Fluid Recommendations:   mL/day     Nutrient Intake: poor 25-50% per EMR        [x] Previous Nutrition Diagnosis: Inadequate oral intake            [x] Ongoing          [] Resolved     Nutrition Diagnostic #1  Problem: Severe malnutrition in the context of chronic disease  Etiology: alzheimer's/dementia  Statement: 13% wt loss in 9 months, <75% of estimated energy requirements for >1 month.        Nutrition Intervention meals and snacks, coordination of care     Goal/Expected Outcome: pt to meet >75% of estimated energy requirements in the next 3 days     Indicator/Monitoring: RD to monitor diet order, energy intake, body composition, NFPF    Recommendation: recommend feeding assistance, as per pt , pt eats better when receiving encouragement Registered Dietitian Follow-Up     Patient Profile Reviewed                           Yes [x]   No []     Nutrition History Previously Obtained        Yes []  No [x] obtained on this assessment      Pertinent Subjective Information: Pt is a poor historian (alzheimer's). Per PCA, pt does not eat well. Per PCA, pt last BM was today (8/16). Spoke with pt  over the phone to obtain a nutrition hx. Pt  reports that she follows a heart healthy diet at home (limiting fats, sodium and cholesterol). Pt  reports that she does not usually have a good appetite and that she does eat very well. Pt  reports that she eats better when encouraged to do so. Pt  states that pt takes vitamin C and vitamin D3 supplements pta. Pt  reports that pt had a hysterectomy in November of last year (9 months ago), and prior to that she weighed 134 lbs. Since then, pt wt has steadily declined. Pt  reports that since then, he has tried to give her 1 ensure per day d/t poor intake. Pt wt from 8/11 is 116 lbs. This is an 18 lbs or 13% wt loss over a time frame of 9 months. Unable to perform NFPE at this time, however pt meets malnutrition criteria for wt loss and poor po intake.      Pertinent Medical Interventions: Pt admitted d/t worsening dementia/confusion. During this admission, pt has had work up for reversible causes of dementia that have come back (-). Pt is awaiting placement pending insurance.      Diet order: DASH/TLC; ensure enlive 1x/day, ensure pudding 1x/day     Anthropometrics:  - Ht. 62"  - Wt. 116 lbs/52.6 kg (8/11, no new wt)  - %wt change NA  - BMI 21.2  - IBW: 110 lbs/50 kg     Pertinent Lab Data: (8/12) RBC 4.04     Pertinent Meds: lovenox, norvasc, hydrochlorothiazide, zocor, diovan    Physical findings  - Appearance: skinny; however normal BMI  - GI function: no diarrhea/constipation  - Tubes: N/A  - Oral/Mouth cavity: no chewing/swallowing difficulty  - Skin: (8/16) intact     Nutrition Requirements  Weight Used: Admission: 116 lbs/52.6 kg     Estimated Energy Needs    Continue [x] 1210-5039 kcal/day (MSJ x 1.2-1.3 AF)  Adjust []  Adjusted Energy Recommendations:   kcal/day        Estimated Protein Needs    Continue [x]  53-64 gm/day (1-1.2 gm/kg CBW) Adjust []  Adjusted Protein Recommendations:   gm/day        Estimated Fluid Needs        Continue [x] 1mL/kcal or per LIP  Adjust []  Adjusted Fluid Recommendations:   mL/day     Nutrient Intake: poor 25-50% per EMR        [x] Previous Nutrition Diagnosis: Inadequate oral intake            [x] Ongoing          [] Resolved     Nutrition Diagnostic #1  Problem: Severe malnutrition in the context of chronic disease  Etiology: alzheimer's/dementia  Statement: 13% wt loss in 9 months, <75% of estimated energy requirements for >1 month.        Nutrition Intervention meals and snacks, coordination of care     Goal/Expected Outcome: pt to meet >75% of estimated energy requirements in the next 3 days     Indicator/Monitoring: RD to monitor diet order, energy intake, body composition, NFPF    Recommendation: recommend feeding assistance, as per pt , pt eats better when receiving encouragement

## 2019-08-16 NOTE — PROGRESS NOTE ADULT - ASSESSMENT
73y F w/ PMH of HTN, HLD, and Alzheimer's dementia presents with worsening dementia. Today is her day 8 is in hospital. Patient seen and examined, she appear ill oriented. She is very anxious most likely due to family not visiting. She is not aware that she is going to nursing home, not her home. Already on memantine, donepezil, quetiapine carbamazepine, no other neuro meds suggested.      #Placement issues  Social team working on this-NO UPDATE YET.    #) Increased episodes of confusion/ agitation ( 2/2 worsening dementia)  - R/o reversible cause  - b12 normal, TSH slightly elevated  - CT head no acute pathology.    #Urinary Tract Infection  - UA +ve WBC, no bacteria s/p rocephin in ED  - No sepsis  - F/u urine cx, -ve  - Pt will likely need placement/ more hours from [facility]  - c/w home meds donepezil, memantine, lexapro, quetiapine.   - follows with Dr Peters.   -Neurology consult made    #) H/o Seizures  - known h/o meningioma resection in 2001 followed by seizures  - c/w carbamazepine  -subtherapeutic levels detected, normal now    #) h/o HTN  - was on home med valsartan/ HCTZ  -amlodipine 5mg started from home meds, if still high start on spironolactone 50 mg daily (also a home med, pt is not currently on it)  -in lower ranges this morning, monitoring for response    #) h/o HLD  - c/w statin    #) diet- DASH  #) ambulate as tolerated  #) DVT ppx- lovenox  #) GI ppx- not indicated  #) Dispo- from home, might need placement.

## 2019-08-16 NOTE — CHART NOTE - NSCHARTNOTEFT_GEN_A_CORE
Upon Nutritional Assessment by the Registered Dietitian your patient was determined to meet criteria / has evidence of the following diagnosis/diagnoses:          [ ]  Mild Protein Calorie Malnutrition        [ ]  Moderate Protein Calorie Malnutrition        [x] Severe Protein Calorie Malnutrition        [ ] Unspecified Protein Calorie Malnutrition        [ ] Underweight / BMI <19        [ ] Morbid Obesity / BMI > 40      Findings as based on:  •  Comprehensive nutrition assessment and consultation  •  Calorie counts (nutrient intake analysis)  •  Food acceptance and intake status from observations by staff  •  Follow up  •  Patient education  •  Intervention secondary to interdisciplinary rounds  •   concerns      Treatment:  Recommend feeding assistance, pt  states that pt eats better when she has encouragement  The following diet has been recommended: continue DASH/TLC; ensure enlive 1x/day, ensure pudding 1x/day      PROVIDER Section:     By signing this assessment you are acknowledging and agree with the diagnosis/diagnoses assigned by the Registered Dietitian    Comments:

## 2019-08-17 RX ADMIN — ESCITALOPRAM OXALATE 20 MILLIGRAM(S): 10 TABLET, FILM COATED ORAL at 11:16

## 2019-08-17 RX ADMIN — Medication 200 MILLIGRAM(S): at 11:15

## 2019-08-17 RX ADMIN — AMLODIPINE BESYLATE 5 MILLIGRAM(S): 2.5 TABLET ORAL at 06:38

## 2019-08-17 RX ADMIN — ENOXAPARIN SODIUM 40 MILLIGRAM(S): 100 INJECTION SUBCUTANEOUS at 11:16

## 2019-08-17 RX ADMIN — VALSARTAN 320 MILLIGRAM(S): 80 TABLET ORAL at 06:38

## 2019-08-17 RX ADMIN — QUETIAPINE FUMARATE 12.5 MILLIGRAM(S): 200 TABLET, FILM COATED ORAL at 11:16

## 2019-08-17 RX ADMIN — MEMANTINE HYDROCHLORIDE 10 MILLIGRAM(S): 10 TABLET ORAL at 06:38

## 2019-08-17 RX ADMIN — Medication 12.5 MILLIGRAM(S): at 06:38

## 2019-08-17 RX ADMIN — DONEPEZIL HYDROCHLORIDE 10 MILLIGRAM(S): 10 TABLET, FILM COATED ORAL at 17:15

## 2019-08-17 RX ADMIN — CHLORHEXIDINE GLUCONATE 1 APPLICATION(S): 213 SOLUTION TOPICAL at 06:38

## 2019-08-17 RX ADMIN — MEMANTINE HYDROCHLORIDE 10 MILLIGRAM(S): 10 TABLET ORAL at 17:14

## 2019-08-17 RX ADMIN — SIMVASTATIN 20 MILLIGRAM(S): 20 TABLET, FILM COATED ORAL at 22:23

## 2019-08-17 RX ADMIN — DONEPEZIL HYDROCHLORIDE 10 MILLIGRAM(S): 10 TABLET, FILM COATED ORAL at 06:38

## 2019-08-18 RX ADMIN — MEMANTINE HYDROCHLORIDE 10 MILLIGRAM(S): 10 TABLET ORAL at 17:26

## 2019-08-18 RX ADMIN — Medication 200 MILLIGRAM(S): at 11:48

## 2019-08-18 RX ADMIN — QUETIAPINE FUMARATE 12.5 MILLIGRAM(S): 200 TABLET, FILM COATED ORAL at 11:47

## 2019-08-18 RX ADMIN — MEMANTINE HYDROCHLORIDE 10 MILLIGRAM(S): 10 TABLET ORAL at 05:39

## 2019-08-18 RX ADMIN — ESCITALOPRAM OXALATE 20 MILLIGRAM(S): 10 TABLET, FILM COATED ORAL at 11:47

## 2019-08-18 RX ADMIN — DONEPEZIL HYDROCHLORIDE 10 MILLIGRAM(S): 10 TABLET, FILM COATED ORAL at 17:26

## 2019-08-18 RX ADMIN — AMLODIPINE BESYLATE 5 MILLIGRAM(S): 2.5 TABLET ORAL at 05:39

## 2019-08-18 RX ADMIN — DONEPEZIL HYDROCHLORIDE 10 MILLIGRAM(S): 10 TABLET, FILM COATED ORAL at 05:39

## 2019-08-18 RX ADMIN — ENOXAPARIN SODIUM 40 MILLIGRAM(S): 100 INJECTION SUBCUTANEOUS at 11:47

## 2019-08-18 RX ADMIN — CHLORHEXIDINE GLUCONATE 1 APPLICATION(S): 213 SOLUTION TOPICAL at 05:39

## 2019-08-18 RX ADMIN — Medication 12.5 MILLIGRAM(S): at 05:39

## 2019-08-18 RX ADMIN — VALSARTAN 320 MILLIGRAM(S): 80 TABLET ORAL at 05:39

## 2019-08-18 RX ADMIN — SIMVASTATIN 20 MILLIGRAM(S): 20 TABLET, FILM COATED ORAL at 21:56

## 2019-08-18 NOTE — PROGRESS NOTE ADULT - ASSESSMENT
73y F w/ PMH of HTN, HLD, and Alzheimer's dementia presents with worsening dementia. Today is her day  is in hospital. Patient seen and examined, she appear ill oriented. She is very anxious most likely due to family not visiting. She is not aware that she is going to nursing home, not her home. Already on memantine, donepezil, quetiapine carbamazepine, no other neuro meds suggested. Attending agrees to continue with current management.       #Placement issues  Social team working on this-NO UPDATE YET. Most likely on monday    #) Increased episodes of confusion/ agitation ( 2/2 worsening dementia)  - R/o reversible cause  - b12 normal, TSH slightly elevated  - CT head no acute pathology.    #Urinary Tract Infection  - UA +ve WBC, no bacteria s/p rocephin in ED  - No sepsis  - F/u urine cx, -ve  - Pt will likely need placement/ more hours from [facility]  - c/w home meds donepezil, memantine, lexapro, quetiapine.   - follows with Dr Peters.   -Neurology consult made    #) H/o Seizures  - known h/o meningioma resection in 2001 followed by seizures  - c/w carbamazepine  -subtherapeutic levels detected, normal now    #) h/o HTN  - was on home med valsartan/ HCTZ  -amlodipine 5mg started from home meds, if still high start on spironolactone 50 mg daily (also a home med, pt is not currently on it)  -in lower ranges this morning, monitoring for response    #) h/o HLD  - c/w statin    #) diet- DASH  #) ambulate as tolerated  #) DVT ppx- lovenox  #) GI ppx- not indicated  #) Dispo- from home, might need placement.

## 2019-08-19 ENCOUNTER — TRANSCRIPTION ENCOUNTER (OUTPATIENT)
Age: 74
End: 2019-08-19

## 2019-08-19 VITALS
TEMPERATURE: 97 F | RESPIRATION RATE: 18 BRPM | DIASTOLIC BLOOD PRESSURE: 69 MMHG | SYSTOLIC BLOOD PRESSURE: 141 MMHG | HEART RATE: 66 BPM

## 2019-08-19 RX ORDER — AMLODIPINE BESYLATE 2.5 MG/1
1 TABLET ORAL
Qty: 0 | Refills: 0 | DISCHARGE
Start: 2019-08-19

## 2019-08-19 RX ADMIN — CHLORHEXIDINE GLUCONATE 1 APPLICATION(S): 213 SOLUTION TOPICAL at 05:42

## 2019-08-19 RX ADMIN — AMLODIPINE BESYLATE 5 MILLIGRAM(S): 2.5 TABLET ORAL at 05:41

## 2019-08-19 RX ADMIN — MEMANTINE HYDROCHLORIDE 10 MILLIGRAM(S): 10 TABLET ORAL at 05:41

## 2019-08-19 RX ADMIN — Medication 12.5 MILLIGRAM(S): at 05:41

## 2019-08-19 RX ADMIN — ESCITALOPRAM OXALATE 20 MILLIGRAM(S): 10 TABLET, FILM COATED ORAL at 11:08

## 2019-08-19 RX ADMIN — DONEPEZIL HYDROCHLORIDE 10 MILLIGRAM(S): 10 TABLET, FILM COATED ORAL at 17:17

## 2019-08-19 RX ADMIN — DONEPEZIL HYDROCHLORIDE 10 MILLIGRAM(S): 10 TABLET, FILM COATED ORAL at 05:42

## 2019-08-19 RX ADMIN — VALSARTAN 320 MILLIGRAM(S): 80 TABLET ORAL at 05:42

## 2019-08-19 RX ADMIN — QUETIAPINE FUMARATE 12.5 MILLIGRAM(S): 200 TABLET, FILM COATED ORAL at 11:09

## 2019-08-19 RX ADMIN — ENOXAPARIN SODIUM 40 MILLIGRAM(S): 100 INJECTION SUBCUTANEOUS at 11:10

## 2019-08-19 RX ADMIN — Medication 200 MILLIGRAM(S): at 11:10

## 2019-08-19 RX ADMIN — MEMANTINE HYDROCHLORIDE 10 MILLIGRAM(S): 10 TABLET ORAL at 17:16

## 2019-08-19 NOTE — DISCHARGE NOTE PROVIDER - CARE PROVIDER_API CALL
Melvin Adames (MD)  Internal Medicine  [address] [place] [address]  Phone: [phone]  Fax: [phone]  Follow Up Time:

## 2019-08-19 NOTE — PROGRESS NOTE ADULT - SUBJECTIVE AND OBJECTIVE BOX
73y F w/ PMH of HTN, HLD, and Alzheimer's dementia presents with worsening dementia. HPI dates back to 3 days as per , when pt started having more pronounced episodes of confusion. As per family, pt is getting more nervous, at times doesnt recognise her , tries to leave the house. Pt has a HHA but family states that it is becoming difficult to care for  her at home. Follows with Dr Parks for dementia. ROS positive for occasional episode of dizziness, negative otherwise. Denies SOB, chest pain, dysuria, GI/ complaints.   VS stable on admission, labs unremarkable. CT head negative. Received rocephin in ED for UA showing + WBC and leukocyte esterase.      PAST MEDICAL & SURGICAL HISTORY:  Seizure disorder  Alzheimer's dementia without behavioral disturbance, unspecified timing of dementia onset  Hypercholesteremia  HTN (hypertension)  H/O: hysterectomy  History of craniotomy      OVERNIGHT EVENTS: ns    SUBJECTIVE / INTERVAL HPI: Patient seen and examined at bedside.     VITAL SIGNS:  Vital Signs Last 24 Hrs  T(C): 36.4 (16 Aug 2019 14:46), Max: 36.4 (16 Aug 2019 14:46)  T(F): 97.5 (16 Aug 2019 14:46), Max: 97.5 (16 Aug 2019 14:46)  HR: 93 (16 Aug 2019 14:46) (71 - 93)  BP: 140/70 (16 Aug 2019 14:51) (120/61 - 180/77)  BP(mean): --  RR: 19 (16 Aug 2019 14:46) (18 - 19)  SpO2: --    PHYSICAL EXAM:    General: WDWN  HEENT: NC/AT; PERRL, anicteric sclera; MMM  Neck: supple  Cardiovascular: +S1/S2, RRR  Respiratory: CTA B/L; no W/R/R  Gastrointestinal: soft, NT/ND; +BSx4  Extremities: WWP; no edema, clubbing or cyanosis  Vascular: 2+ radial, DP/PT pulses B/L  Neurological: AAOx3; no focal deficits    MEDICATIONS:  MEDICATIONS  (STANDING):  amLODIPine   Tablet 5 milliGRAM(s) Oral daily  carBAMazepine XR Tablet 200 milliGRAM(s) Oral daily  chlorhexidine 4% Liquid 1 Application(s) Topical <User Schedule>  donepezil 10 milliGRAM(s) Oral <User Schedule>  enoxaparin Injectable 40 milliGRAM(s) SubCutaneous daily  escitalopram 20 milliGRAM(s) Oral daily  hydrochlorothiazide 12.5 milliGRAM(s) Oral daily  memantine 10 milliGRAM(s) Oral two times a day  QUEtiapine 12.5 milliGRAM(s) Oral daily  simvastatin 20 milliGRAM(s) Oral at bedtime  valsartan 320 milliGRAM(s) Oral daily    MEDICATIONS  (PRN):      ALLERGIES:  Allergies    No Known Allergies    Intolerances        LABS:              CAPILLARY BLOOD GLUCOSE          RADIOLOGY & ADDITIONAL TESTS: Reviewed.
73y F w/ PMH of HTN, HLD, and Alzheimer's dementia presents with worsening dementia. HPI dates back to 3 days as per , when pt started having more pronounced episodes of confusion. As per family, pt is getting more nervous, at times doesnt recognise her , tries to leave the house. Pt has a HHA but family states that it is becoming difficult to care for  her at home. Follows with Dr Parks for dementia. ROS positive for occasional episode of dizziness, negative otherwise. Denies SOB, chest pain, dysuria, GI/ complaints.   VS stable on admission, labs unremarkable. CT head negative. Received rocephin in ED for UA showing + WBC and leukocyte esterase.    PAST MEDICAL & SURGICAL HISTORY:  Seizure disorder  Alzheimer's dementia without behavioral disturbance, unspecified timing of dementia onset  Hypercholesteremia  HTN (hypertension)  H/O: hysterectomy  History of craniotomy      OVERNIGHT EVENTS: ns    SUBJECTIVE / INTERVAL HPI: Patient seen and examined at bedside.     VITAL SIGNS:  Vital Signs Last 24 Hrs  T(C): 35.6 (14 Aug 2019 14:06), Max: 37.1 (13 Aug 2019 21:12)  T(F): 96.1 (14 Aug 2019 14:06), Max: 98.7 (13 Aug 2019 21:12)  HR: 94 (14 Aug 2019 14:06) (74 - 94)  BP: 120/63 (14 Aug 2019 14:06) (108/57 - 140/67)  BP(mean): --  RR: 18 (14 Aug 2019 14:06) (18 - 20)  SpO2: --    PHYSICAL EXAM:    General: WDWN  HEENT: NC/AT; PERRL, anicteric sclera; MMM  Neck: supple  Cardiovascular: +S1/S2, RRR  Respiratory: CTA B/L; no W/R/R  Gastrointestinal: soft, NT/ND; +BSx4  Extremities: WWP; no edema, clubbing or cyanosis  Vascular: 2+ radial, DP/PT pulses B/L  Neurological: AAOx3; no focal deficits    MEDICATIONS:  MEDICATIONS  (STANDING):  amLODIPine   Tablet 5 milliGRAM(s) Oral daily  carBAMazepine XR Tablet 200 milliGRAM(s) Oral daily  chlorhexidine 4% Liquid 1 Application(s) Topical <User Schedule>  donepezil 10 milliGRAM(s) Oral <User Schedule>  enoxaparin Injectable 40 milliGRAM(s) SubCutaneous daily  escitalopram 20 milliGRAM(s) Oral daily  hydrochlorothiazide 12.5 milliGRAM(s) Oral daily  memantine 10 milliGRAM(s) Oral two times a day  QUEtiapine 12.5 milliGRAM(s) Oral daily  simvastatin 20 milliGRAM(s) Oral at bedtime  valsartan 320 milliGRAM(s) Oral daily    MEDICATIONS  (PRN):      ALLERGIES:  Allergies    No Known Allergies    Intolerances        LABS:              CAPILLARY BLOOD GLUCOSE          RADIOLOGY & ADDITIONAL TESTS: Reviewed.
73y F w/ PMH of HTN, HLD, and Alzheimer's dementia presents with worsening dementia. HPI dates back to 3 days as per , when pt started having more pronounced episodes of confusion. As per family, pt is getting more nervous, at times doesnt recognise her , tries to leave the house. Pt has a HHA but family states that it is becoming difficult to care for  her at home. Follows with Dr Parks for dementia. ROS positive for occasional episode of dizziness, negative otherwise. Denies SOB, chest pain, dysuria, GI/ complaints.   VS stable on admission, labs unremarkable. CT head negative. Received rocephin in ED for UA showing + WBC and leukocyte esterase.    PAST MEDICAL & SURGICAL HISTORY:  Seizure disorder  Alzheimer's dementia without behavioral disturbance, unspecified timing of dementia onset  Hypercholesteremia  HTN (hypertension)  H/O: hysterectomy  History of craniotomy      OVERNIGHT EVENTS: ns    SUBJECTIVE / INTERVAL HPI: Patient seen and examined at bedside.     VITAL SIGNS:  Vital Signs Last 24 Hrs  T(C): 35.8 (15 Aug 2019 06:01), Max: 35.8 (15 Aug 2019 06:01)  T(F): 96.4 (15 Aug 2019 06:01), Max: 96.4 (15 Aug 2019 06:01)  HR: 70 (15 Aug 2019 06:01) (70 - 86)  BP: 148/67 (15 Aug 2019 06:01) (116/56 - 148/67)  BP(mean): --  RR: 18 (15 Aug 2019 06:01) (17 - 18)  SpO2: 99% (14 Aug 2019 21:11) (99% - 99%)    PHYSICAL EXAM:    General: WDWN  HEENT: NC/AT; PERRL, anicteric sclera; MMM  Neck: supple  Cardiovascular: +S1/S2, RRR  Respiratory: CTA B/L; no W/R/R  Gastrointestinal: soft, NT/ND; +BSx4  Extremities: WWP; no edema, clubbing or cyanosis  Vascular: 2+ radial, DP/PT pulses B/L  Neurological: AAOx3; no focal deficits    MEDICATIONS:  MEDICATIONS  (STANDING):  amLODIPine   Tablet 5 milliGRAM(s) Oral daily  carBAMazepine XR Tablet 200 milliGRAM(s) Oral daily  chlorhexidine 4% Liquid 1 Application(s) Topical <User Schedule>  donepezil 10 milliGRAM(s) Oral <User Schedule>  enoxaparin Injectable 40 milliGRAM(s) SubCutaneous daily  escitalopram 20 milliGRAM(s) Oral daily  hydrochlorothiazide 12.5 milliGRAM(s) Oral daily  memantine 10 milliGRAM(s) Oral two times a day  QUEtiapine 12.5 milliGRAM(s) Oral daily  simvastatin 20 milliGRAM(s) Oral at bedtime  valsartan 320 milliGRAM(s) Oral daily    MEDICATIONS  (PRN):      ALLERGIES:  Allergies    No Known Allergies    Intolerances        LABS:              CAPILLARY BLOOD GLUCOSE          RADIOLOGY & ADDITIONAL TESTS: Reviewed.
73y F w/ PMH of HTN, HLD, and Alzheimer's dementia presents with worsening dementia. HPI dates back to 3 days as per , when pt started having more pronounced episodes of confusion. As per family, pt is getting more nervous, at times doesnt recognise her , tries to leave the house. Pt has a HHA but family states that it is becoming difficult to care for  her at home. Follows with Dr Parks for dementia. ROS positive for occasional episode of dizziness, negative otherwise. Denies SOB, chest pain, dysuria, GI/ complaints.   VS stable on admission, labs unremarkable. CT head negative. Received rocephin in ED for UA showing + WBC and leukocyte esterase.    PAST MEDICAL & SURGICAL HISTORY:  Seizure disorder  Alzheimer's dementia without behavioral disturbance, unspecified timing of dementia onset  Hypercholesteremia  HTN (hypertension)  H/O: hysterectomy  History of craniotomy      OVERNIGHT EVENTS: ns    SUBJECTIVE / INTERVAL HPI: Patient seen and examined at bedside.     VITAL SIGNS:  Vital Signs Last 24 Hrs  T(C): 36.5 (13 Aug 2019 13:06), Max: 36.5 (13 Aug 2019 13:06)  T(F): 97.7 (13 Aug 2019 13:06), Max: 97.7 (13 Aug 2019 13:06)  HR: 84 (13 Aug 2019 13:06) (74 - 84)  BP: 131/66 (13 Aug 2019 13:06) (104/58 - 131/66)  BP(mean): --  RR: 19 (13 Aug 2019 13:06) (18 - 19)  SpO2: 98% (12 Aug 2019 21:29) (98% - 98%)    PHYSICAL EXAM:    General: WDWN  HEENT: NC/AT; PERRL, anicteric sclera; MMM  Neck: supple  Cardiovascular: +S1/S2, RRR  Respiratory: CTA B/L; no W/R/R  Gastrointestinal: soft, NT/ND; +BSx4  Extremities: WWP; no edema, clubbing or cyanosis  Vascular: 2+ radial, DP/PT pulses B/L  Neurological: AAOx3; no focal deficits    MEDICATIONS:  MEDICATIONS  (STANDING):  amLODIPine   Tablet 5 milliGRAM(s) Oral daily  carBAMazepine XR Tablet 200 milliGRAM(s) Oral daily  chlorhexidine 4% Liquid 1 Application(s) Topical <User Schedule>  donepezil 10 milliGRAM(s) Oral <User Schedule>  enoxaparin Injectable 40 milliGRAM(s) SubCutaneous daily  escitalopram 20 milliGRAM(s) Oral daily  hydrochlorothiazide 12.5 milliGRAM(s) Oral daily  memantine 10 milliGRAM(s) Oral two times a day  QUEtiapine 12.5 milliGRAM(s) Oral daily  simvastatin 20 milliGRAM(s) Oral at bedtime  valsartan 320 milliGRAM(s) Oral daily    MEDICATIONS  (PRN):      ALLERGIES:  Allergies    No Known Allergies    Intolerances        LABS:                        13.2   4.64  )-----------( 189      ( 12 Aug 2019 05:24 )             38.7     08-12    141  |  99  |  16  ----------------------------<  88  3.8   |  31  |  0.8    Ca    9.5      12 Aug 2019 05:24          CAPILLARY BLOOD GLUCOSE          RADIOLOGY & ADDITIONAL TESTS: Reviewed.
73y F w/ PMH of HTN, HLD, and Alzheimer's dementia presents with worsening dementia. HPI dates back to 3 days as per , when pt started having more pronounced episodes of confusion. As per family, pt is getting more nervous, at times doesnt recognise her , tries to leave the house. Pt has a HHA but family states that it is becoming difficult to care for  her at home. Follows with Dr Parks for dementia. ROS positive for occasional episode of dizziness, negative otherwise. Denies SOB, chest pain, dysuria, GI/ complaints.   VS stable on admission, labs unremarkable. CT head negative. Received rocephin in ED for UA showing + WBC and leukocyte esterase.    PAST MEDICAL & SURGICAL HISTORY:  Seizure disorder  Alzheimer's dementia without behavioral disturbance, unspecified timing of dementia onset  Hypercholesteremia  HTN (hypertension)  H/O: hysterectomy  History of craniotomy    OVERNIGHT EVENTS: ns    SUBJECTIVE / INTERVAL HPI: Patient seen and examined at bedside.     VITAL SIGNS:  Vital Signs Last 24 Hrs  T(C): 36.4 (09 Aug 2019 13:30), Max: 36.4 (09 Aug 2019 13:30)  T(F): 97.5 (09 Aug 2019 13:30), Max: 97.5 (09 Aug 2019 13:30)  HR: 97 (09 Aug 2019 13:30) (68 - 97)  BP: 154/55 (09 Aug 2019 13:30) (145/70 - 196/77)  BP(mean): --  RR: 18 (09 Aug 2019 13:30) (18 - 18)  SpO2: 98% (09 Aug 2019 00:22) (98% - 98%)    PHYSICAL EXAM: 	    General: WDWN  HEENT: NC/AT; PERRL, anicteric sclera; MMM  Neck: supple  Cardiovascular: +S1/S2, RRR  Respiratory: CTA B/L; no W/R/R  Gastrointestinal: soft, NT/ND; +BSx4  Extremities: WWP; no edema, clubbing or cyanosis  Vascular: 2+ radial, DP/PT pulses B/L  Neurological: AAOx3; no focal deficits    MEDICATIONS:  MEDICATIONS  (STANDING):  carBAMazepine XR Tablet 200 milliGRAM(s) Oral daily  cefTRIAXone   IVPB 1000 milliGRAM(s) IV Intermittent every 24 hours  chlorhexidine 4% Liquid 1 Application(s) Topical <User Schedule>  donepezil 10 milliGRAM(s) Oral <User Schedule>  enoxaparin Injectable 40 milliGRAM(s) SubCutaneous daily  escitalopram 20 milliGRAM(s) Oral daily  hydrochlorothiazide 12.5 milliGRAM(s) Oral daily  memantine 10 milliGRAM(s) Oral two times a day  QUEtiapine 12.5 milliGRAM(s) Oral daily  simvastatin 20 milliGRAM(s) Oral at bedtime  valsartan 320 milliGRAM(s) Oral daily    MEDICATIONS  (PRN):      ALLERGIES:  Allergies    No Known Allergies    Intolerances        LABS:                        14.6   10.29 )-----------( 215      ( 09 Aug 2019 08:03 )             41.9     08-    142  |  102  |  13  ----------------------------<  137<H>  3.6   |  21  |  0.7    Ca    9.9      09 Aug 2019 08:03  Mg     2.2         TPro  7.3  /  Alb  4.6  /  TBili  0.5  /  DBili  x   /  AST  19  /  ALT  14  /  AlkPhos  85  08-      Urinalysis Basic - ( 08 Aug 2019 20:18 )    Color: Yellow / Appearance: Clear / S.021 / pH: x  Gluc: x / Ketone: Negative  / Bili: Negative / Urobili: <2 mg/dL   Blood: x / Protein: Trace / Nitrite: Negative   Leuk Esterase: Moderate / RBC: 3 /HPF / WBC 9 /HPF   Sq Epi: x / Non Sq Epi: 3 /HPF / Bacteria: Negative      CAPILLARY BLOOD GLUCOSE          RADIOLOGY & ADDITIONAL TESTS: Reviewed.
73y F w/ PMH of HTN, HLD, and Alzheimer's dementia presents with worsening dementia. HPI dates back to 3 days as per , when pt started having more pronounced episodes of confusion. As per family, pt is getting more nervous, at times doesnt recognise her , tries to leave the house. Pt has a HHA but family states that it is becoming difficult to care for  her at home. Follows with Dr Parks for dementia. ROS positive for occasional episode of dizziness, negative otherwise. Denies SOB, chest pain, dysuria, GI/ complaints.   VS stable on admission, labs unremarkable. CT head negative. Received rocephin in ED for UA showing + WBC and leukocyte esterase.    PAST MEDICAL & SURGICAL HISTORY:  Seizure disorder  Alzheimer's dementia without behavioral disturbance, unspecified timing of dementia onset  Hypercholesteremia  HTN (hypertension)  H/O: hysterectomy  History of craniotomy  OVERNIGHT EVENTS:    SUBJECTIVE / INTERVAL HPI: Patient seen and examined at bedside.     VITAL SIGNS:  Vital Signs Last 24 Hrs  T(C): 35.5 (12 Aug 2019 13:36), Max: 37 (11 Aug 2019 22:00)  T(F): 95.9 (12 Aug 2019 13:36), Max: 98.6 (11 Aug 2019 22:00)  HR: 94 (12 Aug 2019 13:36) (74 - 94)  BP: 96/52 (12 Aug 2019 13:36) (96/52 - 160/80)  BP(mean): 99 (11 Aug 2019 22:00) (99 - 99)  RR: 18 (12 Aug 2019 13:36) (18 - 19)  SpO2: --    PHYSICAL EXAM:    General: WDWN  HEENT: NC/AT; PERRL, anicteric sclera; MMM  Neck: supple  Cardiovascular: +S1/S2, RRR  Respiratory: CTA B/L; no W/R/R  Gastrointestinal: soft, NT/ND; +BSx4  Extremities: WWP; no edema, clubbing or cyanosis  Vascular: 2+ radial, DP/PT pulses B/L  Neurological: AAOx3; no focal deficits    MEDICATIONS:  MEDICATIONS  (STANDING):  amLODIPine   Tablet 5 milliGRAM(s) Oral daily  carBAMazepine XR Tablet 200 milliGRAM(s) Oral daily  chlorhexidine 4% Liquid 1 Application(s) Topical <User Schedule>  donepezil 10 milliGRAM(s) Oral <User Schedule>  enoxaparin Injectable 40 milliGRAM(s) SubCutaneous daily  escitalopram 20 milliGRAM(s) Oral daily  hydrochlorothiazide 12.5 milliGRAM(s) Oral daily  memantine 10 milliGRAM(s) Oral two times a day  QUEtiapine 12.5 milliGRAM(s) Oral daily  simvastatin 20 milliGRAM(s) Oral at bedtime  valsartan 320 milliGRAM(s) Oral daily    MEDICATIONS  (PRN):      ALLERGIES:  Allergies    No Known Allergies    Intolerances        LABS:                        13.2   4.64  )-----------( 189      ( 12 Aug 2019 05:24 )             38.7     08-12    141  |  99  |  16  ----------------------------<  88  3.8   |  31  |  0.8    Ca    9.5      12 Aug 2019 05:24          CAPILLARY BLOOD GLUCOSE          RADIOLOGY & ADDITIONAL TESTS: Reviewed.
DAVID SANDOVAL 74y Female  MRN#: [id number]         SUBJECTIVE  Patient is a 74y old Female who presents with a chief complaint of Dementia (19 Aug 2019 10:14)  Currently admitted to medicine with the primary diagnosis of Alzheimer's dementia without behavioral disturbance, unspecified timing of dementia onset  Today is hospital day 11d, No overnight complaints    OBJECTIVE  PAST MEDICAL & SURGICAL HISTORY  Seizure disorder  Alzheimer's dementia without behavioral disturbance, unspecified timing of dementia onset  Hypercholesteremia  HTN (hypertension)  H/O: hysterectomy  History of craniotomy    ALLERGIES:  No Known Allergies    MEDICATIONS:  STANDING MEDICATIONS  amLODIPine   Tablet 5 milliGRAM(s) Oral daily  carBAMazepine XR Tablet 200 milliGRAM(s) Oral daily  chlorhexidine 4% Liquid 1 Application(s) Topical <User Schedule>  donepezil 10 milliGRAM(s) Oral <User Schedule>  enoxaparin Injectable 40 milliGRAM(s) SubCutaneous daily  escitalopram 20 milliGRAM(s) Oral daily  hydrochlorothiazide 12.5 milliGRAM(s) Oral daily  memantine 10 milliGRAM(s) Oral two times a day  QUEtiapine 12.5 milliGRAM(s) Oral daily  simvastatin 20 milliGRAM(s) Oral at bedtime  valsartan 320 milliGRAM(s) Oral daily    PRN MEDICATIONS      VITAL SIGNS: Last 24 Hours  T(C): 36 (19 Aug 2019 06:24), Max: 36.7 (18 Aug 2019 14:41)  T(F): 96.8 (19 Aug 2019 06:24), Max: 98 (18 Aug 2019 14:41)  HR: 66 (19 Aug 2019 06:24) (66 - 87)  BP: 141/69 (19 Aug 2019 06:24) (141/69 - 199/73)  BP(mean): --  RR: 18 (19 Aug 2019 06:24) (18 - 18)  SpO2: --    LABS:          RADIOLOGY:      PHYSICAL EXAM:    GENERAL: NAD,  HEENT:  Atraumatic, Normocephalic. EOMI, PERRLA, conjunctiva and sclera clear, No JVD  PULMONARY: Clear to auscultation bilaterally  CARDIOVASCULAR: Regular  GASTROINTESTINAL: Soft, Nontender, Nondistended; Bowel sounds present  MUSCULOSKELETAL:  2+ Peripheral Pulses, No clubbing, cyanosis, or edema
Patient was seen and examined. Spoke with 1:1 and RN. Chart reviewed.  No events overnight.  Vital Signs Last 24 Hrs  T(F): 97 (12 Aug 2019 04:51), Max: 98.6 (11 Aug 2019 22:00)  HR: 75 (12 Aug 2019 04:51) (74 - 102)  BP: 160/80 (12 Aug 2019 04:51) (146/69 - 160/80)  SpO2: --  MEDICATIONS  (STANDING):  amLODIPine   Tablet 5 milliGRAM(s) Oral daily  carBAMazepine XR Tablet 200 milliGRAM(s) Oral daily  chlorhexidine 4% Liquid 1 Application(s) Topical <User Schedule>  donepezil 10 milliGRAM(s) Oral <User Schedule>  enoxaparin Injectable 40 milliGRAM(s) SubCutaneous daily  escitalopram 20 milliGRAM(s) Oral daily  hydrochlorothiazide 12.5 milliGRAM(s) Oral daily  memantine 10 milliGRAM(s) Oral two times a day  QUEtiapine 12.5 milliGRAM(s) Oral daily  simvastatin 20 milliGRAM(s) Oral at bedtime  valsartan 320 milliGRAM(s) Oral daily    MEDICATIONS  (PRN):    Labs:                        13.2   4.64  )-----------( 189      ( 12 Aug 2019 05:24 )             38.7     12 Aug 2019 05:24    141    |  99     |  16     ----------------------------<  88     3.8     |  31     |  0.8      Ca    9.5        12 Aug 2019 05:24            General: comfortable, NAD  Neurology: awake  Head:  Normocephalic, atraumatic  ENT:  Mucosa moist, no ulcerations  Neck:  Supple, no JVD,   Skin: no breakdowns (as per RN)  Resp: CTA B/L  CV: RRR, S1S2,   GI: Soft, NT, bowel sounds  MS: No edema, + peripheral pulses, FROM all 4 extremity      A/P:  73y F w/ PMH of HTN, HLD, and Alzheimer's dementia presents with worsening dementia.    neurology f/u today    1:1     continue meds    DC planning when cleared by neurology- will need 24 hr care vs SNF  DVT prophylaxis  Decubitus prevention- all measures as per RN protocol  Please call or text me with any questions or updates
Patient was seen and examined. Spoke with RN. Chart reviewed.    No events overnight.  Vital Signs Last 24 Hrs  T(F): 97.3 (16 Aug 2019 06:05), Max: 97.3 (16 Aug 2019 06:05)  HR: 72 (16 Aug 2019 06:05) (71 - 72)  BP: 120/68 (16 Aug 2019 06:05) (120/61 - 120/68)  SpO2: --  MEDICATIONS  (STANDING):  amLODIPine   Tablet 5 milliGRAM(s) Oral daily  carBAMazepine XR Tablet 200 milliGRAM(s) Oral daily  chlorhexidine 4% Liquid 1 Application(s) Topical <User Schedule>  donepezil 10 milliGRAM(s) Oral <User Schedule>  enoxaparin Injectable 40 milliGRAM(s) SubCutaneous daily  escitalopram 20 milliGRAM(s) Oral daily  hydrochlorothiazide 12.5 milliGRAM(s) Oral daily  memantine 10 milliGRAM(s) Oral two times a day  QUEtiapine 12.5 milliGRAM(s) Oral daily  simvastatin 20 milliGRAM(s) Oral at bedtime  valsartan 320 milliGRAM(s) Oral daily    MEDICATIONS  (PRN):    Labs:                  Radiology:    General: comfortable, NAD  Neurology: A&Ox1, nonfocal  Head:  Normocephalic, atraumatic  ENT:  Mucosa moist, no ulcerations  Neck:  Supple, no JVD,   Skin: no breakdowns (as per RN)  Resp: CTA B/L  CV: RRR, S1S2,   GI: Soft, NT, bowel sounds  MS: No edema, + peripheral pulses, FROM all 4 extremity
Patient was seen and examined. Spoke with RN. Chart reviewed.  No events overnight.  Vital Signs Last 24 Hrs  T(F): 96.8 (13 Aug 2019 05:34), Max: 96.8 (13 Aug 2019 05:34)  HR: 74 (13 Aug 2019 05:34) (74 - 94)  BP: 124/63 (13 Aug 2019 05:34) (96/52 - 124/63)  SpO2: 98% (12 Aug 2019 21:29) (98% - 98%)  MEDICATIONS  (STANDING):  amLODIPine   Tablet 5 milliGRAM(s) Oral daily  carBAMazepine XR Tablet 200 milliGRAM(s) Oral daily  chlorhexidine 4% Liquid 1 Application(s) Topical <User Schedule>  donepezil 10 milliGRAM(s) Oral <User Schedule>  enoxaparin Injectable 40 milliGRAM(s) SubCutaneous daily  escitalopram 20 milliGRAM(s) Oral daily  hydrochlorothiazide 12.5 milliGRAM(s) Oral daily  memantine 10 milliGRAM(s) Oral two times a day  QUEtiapine 12.5 milliGRAM(s) Oral daily  simvastatin 20 milliGRAM(s) Oral at bedtime  valsartan 320 milliGRAM(s) Oral daily    MEDICATIONS  (PRN):    Labs:                        13.2   4.64  )-----------( 189      ( 12 Aug 2019 05:24 )             38.7     12 Aug 2019 05:24    141    |  99     |  16     ----------------------------<  88     3.8     |  31     |  0.8      Ca    9.5        12 Aug 2019 05:24            General: comfortable, NAD  Neurology:nonfocal  Head:  Normocephalic, atraumatic  ENT:  Mucosa moist, no ulcerations  Neck:  Supple, no JVD,   Skin: no breakdowns (as per RN)  Resp: CTA B/L  CV: RRR, S1S2,   GI: Soft, NT, bowel sounds  MS: No edema, + peripheral pulses, FROM all 4 extremity      A/P:  73y F w/ PMH of HTN, HLD, and Alzheimer's dementia presents with worsening dementia.    neurology f/u today    1:1     continue meds    DC planning when cleared by neurology- will need 24 hr care vs SNF ([facility] dementia unit)  DVT prophylaxis  Decubitus prevention- all measures as per RN protocol  Please call or text me with any questions or updates
Patient was seen and examined. Spoke with RN. Chart reviewed.  ambulating with nursing  son at bedside,    No events overnight.  Vital Signs Last 24 Hrs  T(F): 96.4 (15 Aug 2019 06:01), Max: 96.4 (15 Aug 2019 06:01)  HR: 70 (15 Aug 2019 06:01) (70 - 94)  BP: 148/67 (15 Aug 2019 06:01) (116/56 - 148/67)  SpO2: 99% (14 Aug 2019 21:11) (99% - 99%)  MEDICATIONS  (STANDING):  amLODIPine   Tablet 5 milliGRAM(s) Oral daily  carBAMazepine XR Tablet 200 milliGRAM(s) Oral daily  chlorhexidine 4% Liquid 1 Application(s) Topical <User Schedule>  donepezil 10 milliGRAM(s) Oral <User Schedule>  enoxaparin Injectable 40 milliGRAM(s) SubCutaneous daily  escitalopram 20 milliGRAM(s) Oral daily  hydrochlorothiazide 12.5 milliGRAM(s) Oral daily  memantine 10 milliGRAM(s) Oral two times a day  QUEtiapine 12.5 milliGRAM(s) Oral daily  simvastatin 20 milliGRAM(s) Oral at bedtime  valsartan 320 milliGRAM(s) Oral daily    MEDICATIONS  (PRN):    Labs:                  Radiology:    General: comfortable, NAD  Neurology: Awake,confused,  Head:  Normocephalic, atraumatic  ENT:  Mucosa moist, no ulcerations  Neck:  Supple, no JVD  Resp: CTA B/L  CV: RRR, S1S2,   GI: Soft, NT, bowel sounds  MS: No edema, + peripheral pulses, FROM all 4 extremity
Patient was seen and examined. Spoke with RN. Chart reviewed.  comf,  sitter at bedside  no distress    No events overnight.  Vital Signs Last 24 Hrs  T(F): 97.4 (10 Aug 2019 05:42), Max: 97.6 (09 Aug 2019 21:20)  HR: 62 (10 Aug 2019 05:42) (62 - 97)  BP: 115/- (10 Aug 2019 05:42) (115/- - 186/88)  SpO2: --  MEDICATIONS  (STANDING):  amLODIPine   Tablet 5 milliGRAM(s) Oral daily  carBAMazepine XR Tablet 200 milliGRAM(s) Oral daily  cefTRIAXone   IVPB 1000 milliGRAM(s) IV Intermittent every 24 hours  chlorhexidine 4% Liquid 1 Application(s) Topical <User Schedule>  donepezil 10 milliGRAM(s) Oral <User Schedule>  enoxaparin Injectable 40 milliGRAM(s) SubCutaneous daily  escitalopram 20 milliGRAM(s) Oral daily  hydrochlorothiazide 12.5 milliGRAM(s) Oral daily  memantine 10 milliGRAM(s) Oral two times a day  QUEtiapine 12.5 milliGRAM(s) Oral daily  simvastatin 20 milliGRAM(s) Oral at bedtime  valsartan 320 milliGRAM(s) Oral daily    MEDICATIONS  (PRN):    Labs:                        13.8   7.50  )-----------( 168      ( 10 Aug 2019 07:06 )             40.2                         14.6   10.29 )-----------( 215      ( 09 Aug 2019 08:03 )             41.9     10 Aug 2019 07:06    141    |  101    |  13     ----------------------------<  88     3.9     |  26     |  0.8    09 Aug 2019 08:03    142    |  102    |  13     ----------------------------<  137    3.6     |  21     |  0.7      Ca    9.8        10 Aug 2019 07:06  Ca    9.9        09 Aug 2019 08:03  Mg     2.2       09 Aug 2019 08:03    TPro  7.3    /  Alb  4.6    /  TBili  0.5    /  DBili  x      /  AST  19     /  ALT  14     /  AlkPhos  85     09 Aug 2019 08:03  TPro  7.1    /  Alb  4.9    /  TBili  0.3    /  DBili  x      /  AST  19     /  ALT  14     /  AlkPhos  92     08 Aug 2019 19:48      Urinalysis Basic - ( 08 Aug 2019 20:18 )    Color: Yellow / Appearance: Clear / S.021 / pH: x  Gluc: x / Ketone: Negative  / Bili: Negative / Urobili: <2 mg/dL   Blood: x / Protein: Trace / Nitrite: Negative   Leuk Esterase: Moderate / RBC: 3 /HPF / WBC 9 /HPF   Sq Epi: x / Non Sq Epi: 3 /HPF / Bacteria: Negative          Radiology:    General: comfortable, NAD  Neurology: A&Ox1 nonfocal  Head:  Normocephalic, atraumatic  ENT:  Mucosa moist, no ulcerations  Neck:  Supple, no JVD,   Skin: no breakdowns (as per RN)  Resp: CTA B/L  CV: RRR, S1S2,   GI: Soft, NT, bowel sounds  MS: No edema, + peripheral pulses, FROM all 4 extremity
Patient was seen and examined. Spoke with RN. Chart reviewed.  sitting comf, calm,  confused,sitter at bedside    No events overnight.  Vital Signs Last 24 Hrs  T(F): 96.6 (17 Aug 2019 06:03), Max: 97.8 (16 Aug 2019 21:45)  HR: 73 (17 Aug 2019 06:03) (69 - 93)  BP: 117/56 (17 Aug 2019 06:03) (116/57 - 180/77)  SpO2: --  MEDICATIONS  (STANDING):  amLODIPine   Tablet 5 milliGRAM(s) Oral daily  carBAMazepine XR Tablet 200 milliGRAM(s) Oral daily  chlorhexidine 4% Liquid 1 Application(s) Topical <User Schedule>  donepezil 10 milliGRAM(s) Oral <User Schedule>  enoxaparin Injectable 40 milliGRAM(s) SubCutaneous daily  escitalopram 20 milliGRAM(s) Oral daily  hydrochlorothiazide 12.5 milliGRAM(s) Oral daily  memantine 10 milliGRAM(s) Oral two times a day  QUEtiapine 12.5 milliGRAM(s) Oral daily  simvastatin 20 milliGRAM(s) Oral at bedtime  valsartan 320 milliGRAM(s) Oral daily    MEDICATIONS  (PRN):    Labs:                  Radiology:    General: comfortable, NAD  Neurology: A&Ox1, nonfocal  Head:  Normocephalic, atraumatic  ENT:  Mucosa moist, no ulcerations  Neck:  Supple, no JVD,   Skin: no breakdowns (as per RN)  Resp: CTA B/L  CV: RRR, S1S2,   GI: Soft, NT, bowel sounds  MS: No edema, + peripheral pulses, FROM all 4 extremity
Patient was seen and examined. Spoke with housestaff. Chart reviewed.  No events overnight.  Vital Signs Last 24 Hrs  T(F): 96.7 (14 Aug 2019 05:36), Max: 98.7 (13 Aug 2019 21:12)  HR: 74 (14 Aug 2019 05:36) (74 - 91)  BP: 140/67 (14 Aug 2019 05:36) (108/57 - 140/67)  SpO2: --  MEDICATIONS  (STANDING):  amLODIPine   Tablet 5 milliGRAM(s) Oral daily  carBAMazepine XR Tablet 200 milliGRAM(s) Oral daily  chlorhexidine 4% Liquid 1 Application(s) Topical <User Schedule>  donepezil 10 milliGRAM(s) Oral <User Schedule>  enoxaparin Injectable 40 milliGRAM(s) SubCutaneous daily  escitalopram 20 milliGRAM(s) Oral daily  hydrochlorothiazide 12.5 milliGRAM(s) Oral daily  memantine 10 milliGRAM(s) Oral two times a day  QUEtiapine 12.5 milliGRAM(s) Oral daily  simvastatin 20 milliGRAM(s) Oral at bedtime  valsartan 320 milliGRAM(s) Oral daily    MEDICATIONS  (PRN):        General: comfortable, NAD  Neurology:  nonfocal  Head:  Normocephalic, atraumatic  ENT:  Mucosa moist, no ulcerations  Neck:  Supple, no JVD,   Skin: no breakdowns (as per RN)  Resp: CTA B/L  CV: RRR, S1S2,   GI: Soft, NT, bowel sounds  MS: No edema, + peripheral pulses, FROM all 4 extremity      A/P:  73y F w/ PMH of HTN, HLD, and Alzheimer's dementia presents with worsening dementia.    neurology f/u today    1:1     continue meds    DC planning- will need 24 hr care vs SNF ([facility] dementia unit)    DVT prophylaxis  Decubitus prevention- all measures as per RN protocol  Please call or text me with any questions or updates
SUBJECTIVE:    Patient is a 73y old Female who presents with a chief complaint of Dementia (10 Aug 2019 12:07)    Currently admitted to medicine with the primary diagnosis of Alzheimer's dementia without behavioral disturbance, unspecified timing of dementia onset     Today is hospital day 3d.   NAD  sitting w AID    PAST MEDICAL & SURGICAL HISTORY  Seizure disorder  Alzheimer's dementia without behavioral disturbance, unspecified timing of dementia onset  Hypercholesteremia  HTN (hypertension)  H/O: hysterectomy  History of craniotomy    SOCIAL HISTORY:  Negative for smoking/alcohol/drug use.     ALLERGIES:  No Known Allergies    MEDICATIONS:  STANDING MEDICATIONS  amLODIPine   Tablet 5 milliGRAM(s) Oral daily  carBAMazepine XR Tablet 200 milliGRAM(s) Oral daily  chlorhexidine 4% Liquid 1 Application(s) Topical <User Schedule>  donepezil 10 milliGRAM(s) Oral <User Schedule>  enoxaparin Injectable 40 milliGRAM(s) SubCutaneous daily  escitalopram 20 milliGRAM(s) Oral daily  hydrochlorothiazide 12.5 milliGRAM(s) Oral daily  memantine 10 milliGRAM(s) Oral two times a day  QUEtiapine 12.5 milliGRAM(s) Oral daily  simvastatin 20 milliGRAM(s) Oral at bedtime  valsartan 320 milliGRAM(s) Oral daily    PRN MEDICATIONS    VITALS:   T(F): 98.4  HR: 102  BP: 149/67  RR: 18  SpO2: --    LABS:                        13.8   7.50  )-----------( 168      ( 10 Aug 2019 07:06 )             40.2     08-10    141  |  101  |  13  ----------------------------<  88  3.9   |  26  |  0.8    Ca    9.8      10 Aug 2019 07:06                    RADIOLOGY:    PHYSICAL EXAM:  GEN: No acute distress  LUNGS: Clear to auscultation bilaterally   HEART: Regular  ABD: Soft, non-tender, non-distended.  EXT: NC/NC/NE/2+PP/HESS/Skin Intact.   NEURO: AAOX3    Intravenous access:   NG tube:   Moreno Catheter:
SUBJECTIVE:    Patient is a 74y old Female who presents with a chief complaint of Dementia (18 Aug 2019 07:02)    Currently admitted to medicine with the primary diagnosis of Alzheimer's dementia without behavioral disturbance, unspecified timing of dementia onset     Today is hospital day 10d.   sitting in chair  anxious   PAST MEDICAL & SURGICAL HISTORY  Seizure disorder  Alzheimer's dementia without behavioral disturbance, unspecified timing of dementia onset  Hypercholesteremia  HTN (hypertension)  H/O: hysterectomy  History of craniotomy    SOCIAL HISTORY:  Negative for smoking/alcohol/drug use.     ALLERGIES:  No Known Allergies    MEDICATIONS:  STANDING MEDICATIONS  amLODIPine   Tablet 5 milliGRAM(s) Oral daily  carBAMazepine XR Tablet 200 milliGRAM(s) Oral daily  chlorhexidine 4% Liquid 1 Application(s) Topical <User Schedule>  donepezil 10 milliGRAM(s) Oral <User Schedule>  enoxaparin Injectable 40 milliGRAM(s) SubCutaneous daily  escitalopram 20 milliGRAM(s) Oral daily  hydrochlorothiazide 12.5 milliGRAM(s) Oral daily  memantine 10 milliGRAM(s) Oral two times a day  QUEtiapine 12.5 milliGRAM(s) Oral daily  simvastatin 20 milliGRAM(s) Oral at bedtime  valsartan 320 milliGRAM(s) Oral daily    PRN MEDICATIONS    VITALS:   T(F): 97  HR: 78  BP: 159/67  RR: 18  SpO2: --    LABS:                        RADIOLOGY:    PHYSICAL EXAM:  GEN: No acute distress  LUNGS: Clear to auscultation bilaterally   HEART: Regular  ABD: Soft, non-tender, non-distended.  EXT: NC/NC/NE/2+PP/HESS/Skin Intact.   NEURO: AAOX3    Intravenous access:   NG tube:   Moreno Catheter:
SUBJECTIVE:    Patient is a 74y old Female who presents with a chief complaint of Dementia (18 Aug 2019 11:31)    Currently admitted to medicine with the primary diagnosis of Alzheimer's dementia without behavioral disturbance, unspecified timing of dementia onset     Today is hospital day 11d. This morning she is resting comfortably in bed and reports no new issues or overnight events.   case manger notes noted family requesting another NH in [place]   PAST MEDICAL & SURGICAL HISTORY  Seizure disorder  Alzheimer's dementia without behavioral disturbance, unspecified timing of dementia onset  Hypercholesteremia  HTN (hypertension)  H/O: hysterectomy  History of craniotomy    SOCIAL HISTORY:  Negative for smoking/alcohol/drug use.     ALLERGIES:  No Known Allergies    MEDICATIONS:  STANDING MEDICATIONS  amLODIPine   Tablet 5 milliGRAM(s) Oral daily  carBAMazepine XR Tablet 200 milliGRAM(s) Oral daily  chlorhexidine 4% Liquid 1 Application(s) Topical <User Schedule>  donepezil 10 milliGRAM(s) Oral <User Schedule>  enoxaparin Injectable 40 milliGRAM(s) SubCutaneous daily  escitalopram 20 milliGRAM(s) Oral daily  hydrochlorothiazide 12.5 milliGRAM(s) Oral daily  memantine 10 milliGRAM(s) Oral two times a day  QUEtiapine 12.5 milliGRAM(s) Oral daily  simvastatin 20 milliGRAM(s) Oral at bedtime  valsartan 320 milliGRAM(s) Oral daily    PRN MEDICATIONS    VITALS:   T(F): 96.8  HR: 66  BP: 141/69  RR: 18  SpO2: --    LABS:                        RADIOLOGY:    PHYSICAL EXAM:  GEN: No acute distress  LUNGS: Clear to auscultation bilaterally   HEART: Regular  ABD: Soft, non-tender, non-distended.  EXT: NC/NC/NE/2+PP/HESS/Skin Intact.   NEURO: AAOX3    Intravenous access:   NG tube:   Moreno Catheter:
73y F w/ PMH of HTN, HLD, and Alzheimer's dementia presents with worsening dementia. HPI dates back to 3 days as per , when pt started having more pronounced episodes of confusion. As per family, pt is getting more nervous, at times doesnt recognise her , tries to leave the house. Pt has a HHA but family states that it is becoming difficult to care for  her at home. Follows with Dr Parks for dementia. ROS positive for occasional episode of dizziness, negative otherwise. Denies SOB, chest pain, dysuria, GI/ complaints.   VS stable on admission, labs unremarkable. CT head negative. Received rocephin in ED for UA showing + WBC and leukocyte esterase.      PAST MEDICAL & SURGICAL HISTORY:  Seizure disorder  Alzheimer's dementia without behavioral disturbance, unspecified timing of dementia onset  Hypercholesteremia  HTN (hypertension)  H/O: hysterectomy  History of craniotomy      OVERNIGHT EVENTS: ns    SUBJECTIVE / INTERVAL HPI: Patient seen and examined at bedside.     VITAL SIGNS:  Vital Signs Last 24 Hrs  T(C): 36.1 (18 Aug 2019 06:37), Max: 36.3 (17 Aug 2019 21:56)  T(F): 97 (18 Aug 2019 06:37), Max: 97.3 (17 Aug 2019 21:56)  HR: 78 (18 Aug 2019 06:37) (70 - 80)  BP: 159/67 (18 Aug 2019 06:37) (97/59 - 159/67)  BP(mean): --  RR: 18 (18 Aug 2019 06:37) (18 - 18)  SpO2: --    PHYSICAL EXAM:    General: WDWN  HEENT: NC/AT; PERRL, anicteric sclera; MMM  Neck: supple  Cardiovascular: +S1/S2, RRR  Respiratory: CTA B/L; no W/R/R  Gastrointestinal: soft, NT/ND; +BSx4  Extremities: WWP; no edema, clubbing or cyanosis  Vascular: 2+ radial, DP/PT pulses B/L  Neurological: AAOx3; no focal deficits    MEDICATIONS:  MEDICATIONS  (STANDING):  amLODIPine   Tablet 5 milliGRAM(s) Oral daily  carBAMazepine XR Tablet 200 milliGRAM(s) Oral daily  chlorhexidine 4% Liquid 1 Application(s) Topical <User Schedule>  donepezil 10 milliGRAM(s) Oral <User Schedule>  enoxaparin Injectable 40 milliGRAM(s) SubCutaneous daily  escitalopram 20 milliGRAM(s) Oral daily  hydrochlorothiazide 12.5 milliGRAM(s) Oral daily  memantine 10 milliGRAM(s) Oral two times a day  QUEtiapine 12.5 milliGRAM(s) Oral daily  simvastatin 20 milliGRAM(s) Oral at bedtime  valsartan 320 milliGRAM(s) Oral daily    MEDICATIONS  (PRN):      ALLERGIES:  Allergies    No Known Allergies    Intolerances        LABS:              CAPILLARY BLOOD GLUCOSE          RADIOLOGY & ADDITIONAL TESTS: Reviewed.
73y F w/ PMH of HTN, HLD, and Alzheimer's dementia presents with worsening dementia. HPI dates back to 3 days as per , when pt started having more pronounced episodes of confusion. As per family, pt is getting more nervous, at times doesnt recognise her , tries to leave the house. Pt has a HHA but family states that it is becoming difficult to care for  her at home. Follows with Dr Parks for dementia. ROS positive for occasional episode of dizziness, negative otherwise. Denies SOB, chest pain, dysuria, GI/ complaints.   VS stable on admission, labs unremarkable. CT head negative. Received rocephin in ED for UA showing + WBC and leukocyte esterase.    PAST MEDICAL & SURGICAL HISTORY:  Seizure disorder  Alzheimer's dementia without behavioral disturbance, unspecified timing of dementia onset  Hypercholesteremia  HTN (hypertension)  H/O: hysterectomy  History of craniotomy    OVERNIGHT EVENTS: ns    SUBJECTIVE / INTERVAL HPI: Patient seen and examined at bedside.     VITAL SIGNS:  Vital Signs Last 24 Hrs  T(C): 36.3 (10 Aug 2019 05:42), Max: 36.4 (09 Aug 2019 13:30)  T(F): 97.4 (10 Aug 2019 05:42), Max: 97.6 (09 Aug 2019 21:20)  HR: 62 (10 Aug 2019 05:42) (62 - 97)  BP: 115/- (10 Aug 2019 05:42) (115/- - 186/88)  BP(mean): --  RR: 18 (10 Aug 2019 05:42) (18 - 19)  SpO2: --    PHYSICAL EXAM:    General: WDWN  HEENT: NC/AT; PERRL, anicteric sclera; MMM  Neck: supple  Cardiovascular: +S1/S2, RRR  Respiratory: CTA B/L; no W/R/R  Gastrointestinal: soft, NT/ND; +BSx4  Extremities: WWP; no edema, clubbing or cyanosis  Vascular: 2+ radial, DP/PT pulses B/L  Neurological: AAOx3; no focal deficits    MEDICATIONS:  MEDICATIONS  (STANDING):  amLODIPine   Tablet 5 milliGRAM(s) Oral daily  carBAMazepine XR Tablet 200 milliGRAM(s) Oral daily  cefTRIAXone   IVPB 1000 milliGRAM(s) IV Intermittent every 24 hours  chlorhexidine 4% Liquid 1 Application(s) Topical <User Schedule>  donepezil 10 milliGRAM(s) Oral <User Schedule>  enoxaparin Injectable 40 milliGRAM(s) SubCutaneous daily  escitalopram 20 milliGRAM(s) Oral daily  hydrochlorothiazide 12.5 milliGRAM(s) Oral daily  memantine 10 milliGRAM(s) Oral two times a day  QUEtiapine 12.5 milliGRAM(s) Oral daily  simvastatin 20 milliGRAM(s) Oral at bedtime  valsartan 320 milliGRAM(s) Oral daily    MEDICATIONS  (PRN):      ALLERGIES:  Allergies    No Known Allergies    Intolerances        LABS:                        13.8   7.50  )-----------( 168      ( 10 Aug 2019 07:06 )             40.2     08-10    141  |  101  |  13  ----------------------------<  88  3.9   |  26  |  0.8    Ca    9.8      10 Aug 2019 07:06  Mg     2.2     08-    TPro  7.3  /  Alb  4.6  /  TBili  0.5  /  DBili  x   /  AST  19  /  ALT  14  /  AlkPhos  85  08-09      Urinalysis Basic - ( 08 Aug 2019 20:18 )    Color: Yellow / Appearance: Clear / S.021 / pH: x  Gluc: x / Ketone: Negative  / Bili: Negative / Urobili: <2 mg/dL   Blood: x / Protein: Trace / Nitrite: Negative   Leuk Esterase: Moderate / RBC: 3 /HPF / WBC 9 /HPF   Sq Epi: x / Non Sq Epi: 3 /HPF / Bacteria: Negative      CAPILLARY BLOOD GLUCOSE          RADIOLOGY & ADDITIONAL TESTS: Reviewed.

## 2019-08-19 NOTE — DISCHARGE NOTE NURSING/CASE MANAGEMENT/SOCIAL WORK - NSDPDISTO_GEN_ALL_CORE
Pt called with questions after his visit with Dr. Rodrigues.  His wife  on .  He would like to proceed with trying to remove polyp endoscopically if feasible.     Sub-Acute rehab

## 2019-08-19 NOTE — PROGRESS NOTE ADULT - PROBLEM SELECTOR PLAN 1
continue Seroquel  Namenda  Aricept  Carbamazepine  stable to discharge when bed Available  [facility] sent yesterday

## 2019-08-19 NOTE — PROGRESS NOTE ADULT - ASSESSMENT
73y F w/ PMH of HTN, HLD, and Alzheimer's dementia presents with worsening dementia. Today is her day  is in hospital. Patient seen and examined, she appear ill oriented. She is very anxious most likely due to family not visiting. She is not aware that she is going to nursing home, not her home. Already on memantine, donepezil, quetiapine carbamazepine, no other neuro meds suggested. Attending agrees to continue with current management.       #Placement issues  Social team working on this-NO UPDATE YET. patient will be discharge soon as the bed is avaialable    #) Increased episodes of confusion/ agitation ( 2/2 worsening dementia)  - R/o reversible cause  - b12 normal, TSH slightly elevated  - CT head no acute pathology.    #Urinary Tract Infection  - UA +ve WBC, no bacteria s/p rocephin in ED  - No sepsis  - F/u urine cx, -ve  - Pt will likely need placement/ more hours from HHA  - c/w home meds donepezil, memantine, lexapro, quetiapine.   - follows with Dr Peters.   -Neurology consult made    #) H/o Seizures  - known h/o meningioma resection in 2001 followed by seizures  - c/w carbamazepine  -subtherapeutic levels detected, normal now    #) h/o HTN  - was on home med valsartan/ HCTZ  -amlodipine 5mg started from home meds, if still high start on spironolactone 50 mg daily (also a home med, pt is not currently on it)  -in lower ranges this morning, monitoring for response    #) h/o HLD  - c/w statin    #) diet- DASH  #) ambulate as tolerated  #) DVT ppx- lovenox  #) GI ppx- not indicated  #) Dispo- from home, might need placement.

## 2019-08-19 NOTE — CHART NOTE - NSCHARTNOTEFT_GEN_A_CORE
Registered Dietitian Follow-Up     Patient Profile Reviewed                           Yes [x]   No []     Nutrition History Previously Obtained        Yes [x]  No []       Pertinent Subjective Information: Pt has advanced dementia. PCA is at bedside upon this assessment. PCA reports that pt has been eating ~50% of meals. This is an improvement since last assessment. Pt has been receiving feeding assistance as recommended upon previous assessment. Per PCA, pt eats well with encouragement. Per PCA and pt, pt had a small BM today (8/19). No diarrhea or constipation reported.      Pertinent Medical Interventions: Pt with advanced dementia, determined to have severe PCM upon previous RD assessment. Pt is awaiting dc to NH pending bed availability.     Diet order: DASH/TLC     Anthropometrics:  - Ht. 62"  - Wt. 52.6 kg/116 lbs. (no new wts)  - %wt change NA  - BMI 21.2  - IBW 50 kg/110 lbs     Pertinent Lab Data: no new labs since previous assessment     Pertinent Meds: lovenox, norvasc, zocor, diovan     Physical Findings:  - Appearance: well nourished  - GI function: no constipation/diarrhea reported  - Tubes: N/A  - Oral/Mouth cavity: no chewing/swallowing difficulty reported.  - Skin: intact (8/19)     Nutrition Requirements  Weight Used: 52.7 kg/116 lbs     Estimated Energy Needs    Continue [x] 3620-6054 kcal/day (MSJ x 1.2-1.3 AF)  Adjust []  Adjusted Energy Recommendations:   kcal/day        Estimated Protein Needs    Continue [x]  53-64 gm/day (1-1.2 gm/kg CBW) Adjust []  Adjusted Protein Recommendations:   gm/day        Estimated Fluid Needs        Continue [x] 1mL/kcal or per LIP  Adjust []  Adjusted Fluid Recommendations:   mL/day    Nutrient Intake: fair (50%) this is an improvement as pt has been receiving feeding assistance/encouragement.        [x] Previous Nutrition Diagnosis: Inadequate oral intake            [x] Ongoing          [] Resolved    [x] Previous Nutrition Diagnosis: Severe PCM in the context of chronic disease            [x] Ongoing          [] Resolved     Nutrition Intervention meals and snacks, coordination of care     Goal/Expected Outcome: pt to meet 50-75% of estimated energy requirements in the next 3 days     Indicator/Monitoring: RD to monitor diet order, energy intake, body composition, NFPF.    Recommendation: continue current diet, encourage po intake

## 2019-08-19 NOTE — CHART NOTE - NSCHARTNOTEFT_GEN_A_CORE
<<<RESIDENT DISCHARGE NOTE>>>     DAVID SANDOVAL  MRN-[id number]    VITAL SIGNS:  T(F): 96.8 (08-19-19 @ 06:24), Max: 98 (08-18-19 @ 14:41)  HR: 66 (08-19-19 @ 06:24)  BP: 141/69 (08-19-19 @ 06:24)  SpO2: --      PHYSICAL EXAMINATION:  General: Awake, alert, oriented  Head & Neck: No JVD, atraumatic  Pulmonary: Clear to auscultation  Cardiovascular: Regular S1/S2, no murmurs  Gastrointestinal/Abdomen & Pelvis: Soft, non-tender, non-distended  Neurologic/Motor: Non-focal    TEST RESULTS:              FINAL DISCHARGE INTERVIEW:  Resident(s) Present: (Name: Abdifatah), RN Present: (Name: Liberty)    DISCHARGE MEDICATION RECONCILIATION  reviewed with Attending (Name: Mukund)    DISPOSITION:   [  ] Home,    [  ] Home with Visiting Nursing Services,   [  x  ]  SNF/ NH,    [   ] Acute Rehab (4A),   [   ] Other (Specify:)

## 2019-08-19 NOTE — PROGRESS NOTE ADULT - REASON FOR ADMISSION
Dementia

## 2019-08-19 NOTE — DISCHARGE NOTE PROVIDER - HOSPITAL COURSE
73y F w/ PMH of HTN, HLD, and Alzheimer's dementia presents with worsening dementia. Today is her day  is in hospital. Patient seen and examined, she appear ill oriented. She is very anxious most likely due to family not visiting. She is not aware that she is going to nursing home, not her home. Already on memantine, donepezil, quetiapine carbamazepine, no other neuro meds suggested. Attending agrees to continue with current management.

## 2019-08-19 NOTE — DISCHARGE NOTE PROVIDER - NSDCCPCAREPLAN_GEN_ALL_CORE_FT
PRINCIPAL DISCHARGE DIAGNOSIS  Diagnosis: Alzheimer's dementia without behavioral disturbance, unspecified timing of dementia onset  Assessment and Plan of Treatment: Continue taking medications as perscribed, follow-up with primary care doctor

## 2019-08-19 NOTE — DISCHARGE NOTE NURSING/CASE MANAGEMENT/SOCIAL WORK - NSDCDPATPORTLINK_GEN_ALL_CORE
You can access the [facility] Patient Portal, offered by [facility], by registering with the following website: [email or web address]

## 2019-08-19 NOTE — PROGRESS NOTE ADULT - PROVIDER SPECIALTY LIST ADULT
Internal Medicine

## 2019-08-22 DIAGNOSIS — E43 UNSPECIFIED SEVERE PROTEIN-CALORIE MALNUTRITION: ICD-10-CM

## 2019-08-22 DIAGNOSIS — G30.9 ALZHEIMER'S DISEASE, UNSPECIFIED: ICD-10-CM

## 2019-08-22 DIAGNOSIS — Z87.891 PERSONAL HISTORY OF NICOTINE DEPENDENCE: ICD-10-CM

## 2019-08-22 DIAGNOSIS — Z90.710 ACQUIRED ABSENCE OF BOTH CERVIX AND UTERUS: ICD-10-CM

## 2019-08-22 DIAGNOSIS — R45.1 RESTLESSNESS AND AGITATION: ICD-10-CM

## 2019-08-22 DIAGNOSIS — F02.81 DEMENTIA IN OTHER DISEASES CLASSIFIED ELSEWHERE, UNSPECIFIED SEVERITY, WITH BEHAVIORAL DISTURBANCE: ICD-10-CM

## 2019-08-22 DIAGNOSIS — I10 ESSENTIAL (PRIMARY) HYPERTENSION: ICD-10-CM

## 2019-08-22 DIAGNOSIS — N39.0 URINARY TRACT INFECTION, SITE NOT SPECIFIED: ICD-10-CM

## 2019-08-22 DIAGNOSIS — E78.5 HYPERLIPIDEMIA, UNSPECIFIED: ICD-10-CM

## 2019-10-30 ENCOUNTER — APPOINTMENT (OUTPATIENT)
Dept: NEUROLOGY | Facility: CLINIC | Age: 74
End: 2019-10-30

## 2021-08-09 NOTE — PATIENT PROFILE ADULT. - SURGICAL SITE INCISION
I would like her to try the lozenges. We may need to decipher why she is getting thrush???  She does not have any medications that could be causing this reoccurrence. no

## 2023-03-02 NOTE — ED ADULT TRIAGE NOTE - CHIEF COMPLAINT QUOTE
Is This A New Presentation, Or A Follow-Up?: Skin Lesions Pt BIBA for AMS x 3days. Pt's family requesting to see neurologist. Pt a&ox1, disoriented to time and situation; HX of alzheimers